# Patient Record
Sex: FEMALE | URBAN - METROPOLITAN AREA
[De-identification: names, ages, dates, MRNs, and addresses within clinical notes are randomized per-mention and may not be internally consistent; named-entity substitution may affect disease eponyms.]

---

## 2020-11-03 ENCOUNTER — APPOINTMENT (RX ONLY)
Dept: URBAN - METROPOLITAN AREA CLINIC 73 | Facility: CLINIC | Age: 75
Setting detail: DERMATOLOGY
End: 2020-11-03

## 2020-11-03 DIAGNOSIS — Z85.828 PERSONAL HISTORY OF OTHER MALIGNANT NEOPLASM OF SKIN: ICD-10-CM

## 2020-11-03 DIAGNOSIS — L21.8 OTHER SEBORRHEIC DERMATITIS: ICD-10-CM

## 2020-11-03 DIAGNOSIS — L85.3 XEROSIS CUTIS: ICD-10-CM

## 2020-11-03 DIAGNOSIS — L81.4 OTHER MELANIN HYPERPIGMENTATION: ICD-10-CM

## 2020-11-03 DIAGNOSIS — L57.0 ACTINIC KERATOSIS: ICD-10-CM

## 2020-11-03 DIAGNOSIS — D18.0 HEMANGIOMA: ICD-10-CM

## 2020-11-03 DIAGNOSIS — L82.1 OTHER SEBORRHEIC KERATOSIS: ICD-10-CM

## 2020-11-03 DIAGNOSIS — L24 IRRITANT CONTACT DERMATITIS: ICD-10-CM

## 2020-11-03 PROBLEM — L24.9 IRRITANT CONTACT DERMATITIS, UNSPECIFIED CAUSE: Status: ACTIVE | Noted: 2020-11-03

## 2020-11-03 PROBLEM — D18.01 HEMANGIOMA OF SKIN AND SUBCUTANEOUS TISSUE: Status: ACTIVE | Noted: 2020-11-03

## 2020-11-03 PROCEDURE — 17003 DESTRUCT PREMALG LES 2-14: CPT

## 2020-11-03 PROCEDURE — ? TREATMENT REGIMEN

## 2020-11-03 PROCEDURE — ? OTHER

## 2020-11-03 PROCEDURE — 17000 DESTRUCT PREMALG LESION: CPT

## 2020-11-03 PROCEDURE — ? PRESCRIPTION

## 2020-11-03 PROCEDURE — ? LIQUID NITROGEN

## 2020-11-03 PROCEDURE — ? COUNSELING

## 2020-11-03 PROCEDURE — 99203 OFFICE O/P NEW LOW 30 MIN: CPT | Mod: 25

## 2020-11-03 RX ORDER — TRIAMCINOLONE ACETONIDE 1 MG/G
CREAM TOPICAL BID
Qty: 1 | Refills: 1 | Status: ERX | COMMUNITY
Start: 2020-11-03

## 2020-11-03 RX ORDER — FLUOCINONIDE 0.5 MG/ML
SOLUTION TOPICAL
Qty: 1 | Refills: 1 | Status: ERX | COMMUNITY
Start: 2020-11-03

## 2020-11-03 RX ADMIN — FLUOCINONIDE: 0.5 SOLUTION TOPICAL at 00:00

## 2020-11-03 RX ADMIN — TRIAMCINOLONE ACETONIDE: 1 CREAM TOPICAL at 00:00

## 2020-11-03 ASSESSMENT — LOCATION SIMPLE DESCRIPTION DERM
LOCATION SIMPLE: LEFT FOREHEAD
LOCATION SIMPLE: RIGHT CHEEK
LOCATION SIMPLE: RIGHT LIP
LOCATION SIMPLE: RIGHT EAR
LOCATION SIMPLE: RIGHT UPPER ARM
LOCATION SIMPLE: LEFT PRETIBIAL REGION
LOCATION SIMPLE: RIGHT POSTERIOR THIGH
LOCATION SIMPLE: LEFT EAR
LOCATION SIMPLE: RIGHT NOSE
LOCATION SIMPLE: NOSE
LOCATION SIMPLE: POSTERIOR NECK
LOCATION SIMPLE: LEFT BUTTOCK
LOCATION SIMPLE: CHEST
LOCATION SIMPLE: RIGHT UPPER BACK
LOCATION SIMPLE: SCALP
LOCATION SIMPLE: LEFT POSTERIOR THIGH
LOCATION SIMPLE: RIGHT PRETIBIAL REGION

## 2020-11-03 ASSESSMENT — LOCATION DETAILED DESCRIPTION DERM
LOCATION DETAILED: LEFT BUTTOCK
LOCATION DETAILED: RIGHT SUPERIOR CRUS OF ANTIHELIX
LOCATION DETAILED: RIGHT MEDIAL SUPERIOR CHEST
LOCATION DETAILED: RIGHT INFERIOR VERMILION LIP
LOCATION DETAILED: RIGHT INFERIOR CENTRAL MALAR CHEEK
LOCATION DETAILED: RIGHT ANTERIOR LATERAL DISTAL UPPER ARM
LOCATION DETAILED: LEFT SUPERIOR CRUS OF ANTIHELIX
LOCATION DETAILED: RIGHT SUPERIOR VERMILION LIP
LOCATION DETAILED: LEFT INFERIOR FOREHEAD
LOCATION DETAILED: LEFT DISTAL POSTERIOR THIGH
LOCATION DETAILED: LEFT PROXIMAL PRETIBIAL REGION
LOCATION DETAILED: RIGHT SUPERIOR MEDIAL UPPER BACK
LOCATION DETAILED: RIGHT LATERAL TRAPEZIAL NECK
LOCATION DETAILED: RIGHT PROXIMAL PRETIBIAL REGION
LOCATION DETAILED: RIGHT PROXIMAL POSTERIOR THIGH
LOCATION DETAILED: NASAL ROOT
LOCATION DETAILED: RIGHT SUPERIOR PARIETAL SCALP
LOCATION DETAILED: RIGHT MID-UPPER BACK
LOCATION DETAILED: RIGHT NASAL DORSUM
LOCATION DETAILED: RIGHT NASAL SIDEWALL

## 2020-11-03 ASSESSMENT — LOCATION ZONE DERM
LOCATION ZONE: SCALP
LOCATION ZONE: ARM
LOCATION ZONE: LEG
LOCATION ZONE: LIP
LOCATION ZONE: NECK
LOCATION ZONE: FACE
LOCATION ZONE: TRUNK
LOCATION ZONE: EAR
LOCATION ZONE: NOSE

## 2020-11-03 ASSESSMENT — PAIN INTENSITY VAS: HOW INTENSE IS YOUR PAIN 0 BEING NO PAIN, 10 BEING THE MOST SEVERE PAIN POSSIBLE?: NO PAIN

## 2020-11-03 NOTE — PROCEDURE: OTHER
Note Text (......Xxx Chief Complaint.): This diagnosis correlates with the
Detail Level: Simple
Other (Free Text): Dry and sensitive skin care was discussed. Handout was given.
Other (Free Text): Disc could be the acidity or sulfites or other in the red wine; pt reports is going to avoid red wine; has some irritation on undersurface of upper lip also;\\nDisc using TAC 0.1% on lips bid x 5-7 days \\n-(Pt reports having angioedema when she drinks white wine - bradykinin release as reported by prev derm; cannot tolerate white wine)\\nDisc to use the TAC then can try red wine again.\\n-consider allergy/immunology prn\\n-d/w pt that we mainly see xerosis/erythema on lips\\n-dry/sens skin care

## 2020-11-03 NOTE — HPI: RASH
How Severe Is Your Rash?: moderate
Is This A New Presentation, Or A Follow-Up?: Rash
Additional History: Patient reports rash first presented in [place] about one year ago. Patient has been using otc sarna lotion which doesn’t help.
Additional History: Patient reports lips are swollen and thinks it is due to red wine.

## 2020-11-03 NOTE — PROCEDURE: LIQUID NITROGEN
Duration Of Freeze Thaw-Cycle (Seconds): 5
Post-Care Instructions: I reviewed with the patient in detail post-care instructions. Patient is to wear sunprotection, and avoid picking at any of the treated lesions. Pt may apply Vaseline to crusted or scabbing areas.
Consent: The patient's consent was obtained including but not limited to risks of crusting, scabbing, blistering, scarring, darker or lighter pigmentary change, recurrence, incomplete removal and infection.
Render Note In Bullet Format When Appropriate: No
Detail Level: Detailed
Render Post-Care Instructions In Note?: yes
Number Of Freeze-Thaw Cycles: 2 freeze-thaw cycles

## 2020-11-03 NOTE — HPI: EVALUATION OF SKIN LESION(S)
How Severe Are Your Spot(S)?: moderate
Hpi Title: Evaluation of Skin Lesions
Additional History: Patient is present for a full body skin exam.

## 2020-11-03 NOTE — PROCEDURE: TREATMENT REGIMEN
Initiate Treatment: TAC 0.1% apply to affected areas on body bid x 7-10 days
Detail Level: Zone
Plan: Ok to continue sarna lotion
Initiate Treatment: Fluocinonide solution apply to affected areas on scalp bid x 7-10 days
Initiate Treatment: TAC 0.1% on lips bid x 5-7 days

## 2020-12-01 ENCOUNTER — APPOINTMENT (RX ONLY)
Dept: URBAN - METROPOLITAN AREA CLINIC 73 | Facility: CLINIC | Age: 75
Setting detail: DERMATOLOGY
End: 2020-12-01

## 2020-12-01 DIAGNOSIS — L85.3 XEROSIS CUTIS: ICD-10-CM | Status: IMPROVED

## 2020-12-01 DIAGNOSIS — L21.8 OTHER SEBORRHEIC DERMATITIS: ICD-10-CM | Status: IMPROVED

## 2020-12-01 DIAGNOSIS — L24 IRRITANT CONTACT DERMATITIS: ICD-10-CM | Status: INADEQUATELY CONTROLLED

## 2020-12-01 PROBLEM — L24.9 IRRITANT CONTACT DERMATITIS, UNSPECIFIED CAUSE: Status: ACTIVE | Noted: 2020-12-01

## 2020-12-01 PROCEDURE — ? OTHER

## 2020-12-01 PROCEDURE — ? COUNSELING

## 2020-12-01 PROCEDURE — ? TREATMENT REGIMEN

## 2020-12-01 PROCEDURE — 99214 OFFICE O/P EST MOD 30 MIN: CPT

## 2020-12-01 ASSESSMENT — LOCATION ZONE DERM
LOCATION ZONE: TRUNK
LOCATION ZONE: LIP
LOCATION ZONE: EAR
LOCATION ZONE: SCALP

## 2020-12-01 ASSESSMENT — LOCATION SIMPLE DESCRIPTION DERM
LOCATION SIMPLE: RIGHT EAR
LOCATION SIMPLE: RIGHT UPPER BACK
LOCATION SIMPLE: RIGHT LIP
LOCATION SIMPLE: SCALP
LOCATION SIMPLE: LEFT EAR

## 2020-12-01 ASSESSMENT — LOCATION DETAILED DESCRIPTION DERM
LOCATION DETAILED: RIGHT SUPERIOR CRUS OF ANTIHELIX
LOCATION DETAILED: LEFT SUPERIOR CRUS OF ANTIHELIX
LOCATION DETAILED: RIGHT SUPERIOR PARIETAL SCALP
LOCATION DETAILED: RIGHT SUPERIOR VERMILION LIP
LOCATION DETAILED: RIGHT INFERIOR VERMILION LIP
LOCATION DETAILED: RIGHT SUPERIOR MEDIAL UPPER BACK

## 2020-12-01 ASSESSMENT — PAIN INTENSITY VAS: HOW INTENSE IS YOUR PAIN 0 BEING NO PAIN, 10 BEING THE MOST SEVERE PAIN POSSIBLE?: NO PAIN

## 2020-12-01 NOTE — PROCEDURE: TREATMENT REGIMEN
Detail Level: Zone
Continue Regimen: TAC 0.1% on lips bid x 7 days
Continue Regimen: TAC 0.1% apply to affected areas on body bid x 7-10 days
Continue Regimen: Fluocinonide solution apply to affected areas on scalp bid x 7-10 days
Otc Regimen: Antidandruff shampoo wash scalp and ears TIW

## 2020-12-01 NOTE — PROCEDURE: OTHER
Other (Free Text): - Pt reports she has avoided red and white wine since the last OV\\n- Pt reports she has been using hypoallergenic products.\\n- Pt uses CeraVe product on face and make up products that pt cannot remember the name\\n- Pt will continue Vaseline and hypoallergenic products \\n- Pt will call back with makeup product and product that she applies on lips\\n- disc recommend patch testing NA 80 in the future with Dr. Lino or Dr. Ben Prabhakar\\n- Pt reports eating spicy chips - will try to see if makes lips worse\\n- disc common allergens: fragrances, nickel, preservatives etc and to avoid, dejon propylene glycol for now\\n- disc will restart TAC 0.1% bid x 1 week. Pt will take a photo of lips daily
Detail Level: Simple
Note Text (......Xxx Chief Complaint.): This diagnosis correlates with the
Other (Free Text): Dry and sensitive skin care was discussed.\\nDisc rash is still active in areas that are hard to reach for the patient. Will use TAC 0.1% bid x 7-10 days
Other (Free Text): Disc using Fluocinonide solution bid x 7-10 days for flares\\nRecommended using antidandruff shampoo for maintenance. Pt reports using tea tree oil shampoo\\nPt will test spot tea tree oil shampoo on inner fold of arm for possible ICD rash.

## 2021-09-07 ENCOUNTER — APPOINTMENT (RX ONLY)
Dept: URBAN - METROPOLITAN AREA CLINIC 73 | Facility: CLINIC | Age: 76
Setting detail: DERMATOLOGY
End: 2021-09-07

## 2021-09-07 DIAGNOSIS — T07XXXA INSECT BITE, NONVENOMOUS, OF OTHER, MULTIPLE, AND UNSPECIFIED SITES, WITHOUT MENTION OF INFECTION: ICD-10-CM

## 2021-09-07 DIAGNOSIS — L85.3 XEROSIS CUTIS: ICD-10-CM | Status: INADEQUATELY CONTROLLED

## 2021-09-07 DIAGNOSIS — D22 MELANOCYTIC NEVI: ICD-10-CM

## 2021-09-07 DIAGNOSIS — D18.0 HEMANGIOMA: ICD-10-CM

## 2021-09-07 DIAGNOSIS — L82.1 OTHER SEBORRHEIC KERATOSIS: ICD-10-CM

## 2021-09-07 DIAGNOSIS — L82.0 INFLAMED SEBORRHEIC KERATOSIS: ICD-10-CM

## 2021-09-07 DIAGNOSIS — Z85.828 PERSONAL HISTORY OF OTHER MALIGNANT NEOPLASM OF SKIN: ICD-10-CM

## 2021-09-07 DIAGNOSIS — L81.4 OTHER MELANIN HYPERPIGMENTATION: ICD-10-CM

## 2021-09-07 DIAGNOSIS — L57.0 ACTINIC KERATOSIS: ICD-10-CM

## 2021-09-07 DIAGNOSIS — L72.0 EPIDERMAL CYST: ICD-10-CM

## 2021-09-07 PROBLEM — D22.5 MELANOCYTIC NEVI OF TRUNK: Status: ACTIVE | Noted: 2021-09-07

## 2021-09-07 PROBLEM — D48.5 NEOPLASM OF UNCERTAIN BEHAVIOR OF SKIN: Status: ACTIVE | Noted: 2021-09-07

## 2021-09-07 PROBLEM — D22.72 MELANOCYTIC NEVI OF LEFT LOWER LIMB, INCLUDING HIP: Status: ACTIVE | Noted: 2021-09-07

## 2021-09-07 PROBLEM — S40.862A INSECT BITE (NONVENOMOUS) OF LEFT UPPER ARM, INITIAL ENCOUNTER: Status: ACTIVE | Noted: 2021-09-07

## 2021-09-07 PROBLEM — D18.01 HEMANGIOMA OF SKIN AND SUBCUTANEOUS TISSUE: Status: ACTIVE | Noted: 2021-09-07

## 2021-09-07 PROCEDURE — 99213 OFFICE O/P EST LOW 20 MIN: CPT | Mod: 25

## 2021-09-07 PROCEDURE — ? ACNE SURGERY

## 2021-09-07 PROCEDURE — 10040 EXTRACTION: CPT

## 2021-09-07 PROCEDURE — 17110 DESTRUCTION B9 LES UP TO 14: CPT

## 2021-09-07 PROCEDURE — ? TREATMENT REGIMEN

## 2021-09-07 PROCEDURE — 17000 DESTRUCT PREMALG LESION: CPT | Mod: 59

## 2021-09-07 PROCEDURE — ? LIQUID NITROGEN

## 2021-09-07 PROCEDURE — ? COUNSELING

## 2021-09-07 PROCEDURE — 17003 DESTRUCT PREMALG LES 2-14: CPT | Mod: 59

## 2021-09-07 PROCEDURE — ? OBSERVATION AND MEASURE

## 2021-09-07 ASSESSMENT — LOCATION SIMPLE DESCRIPTION DERM
LOCATION SIMPLE: LEFT POSTERIOR UPPER ARM
LOCATION SIMPLE: LEFT CALF
LOCATION SIMPLE: RIGHT CHEEK
LOCATION SIMPLE: LEFT GREAT TOE
LOCATION SIMPLE: RIGHT SCALP
LOCATION SIMPLE: RIGHT EAR
LOCATION SIMPLE: RIGHT POPLITEAL SKIN
LOCATION SIMPLE: RIGHT NOSE
LOCATION SIMPLE: CHEST
LOCATION SIMPLE: RIGHT UPPER BACK
LOCATION SIMPLE: LEFT UPPER BACK
LOCATION SIMPLE: LEFT CHEEK

## 2021-09-07 ASSESSMENT — LOCATION DETAILED DESCRIPTION DERM
LOCATION DETAILED: RIGHT MID-UPPER BACK
LOCATION DETAILED: LEFT SUPERIOR UPPER BACK
LOCATION DETAILED: RIGHT MEDIAL SUPERIOR CHEST
LOCATION DETAILED: LEFT PROXIMAL POSTERIOR UPPER ARM
LOCATION DETAILED: RIGHT LATERAL MALAR CHEEK
LOCATION DETAILED: RIGHT SUPERIOR MEDIAL UPPER BACK
LOCATION DETAILED: LEFT INFERIOR CENTRAL MALAR CHEEK
LOCATION DETAILED: LEFT PROXIMAL CALF
LOCATION DETAILED: RIGHT SUPERIOR HELIX
LOCATION DETAILED: RIGHT NARIS
LOCATION DETAILED: LEFT DORSAL GREAT TOE
LOCATION DETAILED: RIGHT CENTRAL FRONTAL SCALP
LOCATION DETAILED: RIGHT INFERIOR CENTRAL MALAR CHEEK
LOCATION DETAILED: RIGHT MEDIAL MALAR CHEEK
LOCATION DETAILED: RIGHT POPLITEAL SKIN

## 2021-09-07 ASSESSMENT — LOCATION ZONE DERM
LOCATION ZONE: FACE
LOCATION ZONE: EAR
LOCATION ZONE: ARM
LOCATION ZONE: TOE
LOCATION ZONE: SCALP
LOCATION ZONE: NOSE
LOCATION ZONE: LEG
LOCATION ZONE: TRUNK

## 2021-09-07 NOTE — PROCEDURE: COUNSELING
Detail Level: Zone
Detail Level: Detailed
Patient Specific Counseling (Will Not Stick From Patient To Patient): -disc r/b/sed retin-A vs acne surgery

## 2021-09-07 NOTE — PROCEDURE: LIQUID NITROGEN
Duration Of Freeze Thaw-Cycle (Seconds): 5
Show Applicator Variable?: Yes
Detail Level: Detailed
Consent: The patient's consent was obtained including but not limited to risks of crusting, scabbing, blistering, scarring, darker or lighter pigmentary change, recurrence, incomplete removal and infection.
Post-Care Instructions: I reviewed with the patient in detail post-care instructions. Patient is to wear sunprotection, and avoid picking at any of the treated lesions. Pt may apply Vaseline to crusted or scabbing areas.
Number Of Freeze-Thaw Cycles: 2 freeze-thaw cycles
Render Note In Bullet Format When Appropriate: No
Medical Necessity Information: It is in your best interest to select a reason for this procedure from the list below. All of these items fulfill various CMS LCD requirements except the new and changing color options.
Medical Necessity Clause: This procedure was medically necessary because the lesions that were treated were:  inflamed, irritated

## 2021-09-07 NOTE — HPI: SKIN LESION
Is This A New Presentation, Or A Follow-Up?: Skin Lesions
How Severe Is Your Skin Lesion?: moderate
Has Your Skin Lesion Been Treated?: not been treated
Additional History: Patient presents with lesions on face reported as white, hard, raised bumps with no sensation and no previous treatment.

## 2021-09-07 NOTE — PROCEDURE: ACNE SURGERY
Post-Care Instructions: I reviewed with the patient in detail post-care instructions. Patient is to keep the treatment areaas dry overnight, and then apply bacitracin twice daily until healed. Patient may apply hydrogen peroxide soaks to remove any crusting.
Extraction Method: 30 gauge needle and comedo extractor
Acne Type: Comedonal Lesions
Consent was obtained and risks were reviewed including but not limited to scarring, infection, bleeding, scabbing, incomplete removal, and allergy to anesthesia.
Prep Text (Optional): Prior to removal the treatment areas were prepped in the usual fashion.
Hemostasis: Electrocautery
Render Number Of Lesions Treated: yes
Detail Level: Detailed

## 2022-09-13 ENCOUNTER — APPOINTMENT (RX ONLY)
Dept: URBAN - METROPOLITAN AREA CLINIC 73 | Facility: CLINIC | Age: 77
Setting detail: DERMATOLOGY
End: 2022-09-13

## 2022-09-13 DIAGNOSIS — L57.0 ACTINIC KERATOSIS: ICD-10-CM

## 2022-09-13 DIAGNOSIS — L85.3 XEROSIS CUTIS: ICD-10-CM | Status: IMPROVED

## 2022-09-13 DIAGNOSIS — D22 MELANOCYTIC NEVI: ICD-10-CM

## 2022-09-13 DIAGNOSIS — Z85.828 PERSONAL HISTORY OF OTHER MALIGNANT NEOPLASM OF SKIN: ICD-10-CM

## 2022-09-13 DIAGNOSIS — L82.0 INFLAMED SEBORRHEIC KERATOSIS: ICD-10-CM

## 2022-09-13 DIAGNOSIS — D18.0 HEMANGIOMA: ICD-10-CM

## 2022-09-13 DIAGNOSIS — L82.1 OTHER SEBORRHEIC KERATOSIS: ICD-10-CM

## 2022-09-13 DIAGNOSIS — L21.8 OTHER SEBORRHEIC DERMATITIS: ICD-10-CM

## 2022-09-13 DIAGNOSIS — L81.4 OTHER MELANIN HYPERPIGMENTATION: ICD-10-CM

## 2022-09-13 PROBLEM — D22.72 MELANOCYTIC NEVI OF LEFT LOWER LIMB, INCLUDING HIP: Status: ACTIVE | Noted: 2022-09-13

## 2022-09-13 PROBLEM — D18.01 HEMANGIOMA OF SKIN AND SUBCUTANEOUS TISSUE: Status: ACTIVE | Noted: 2022-09-13

## 2022-09-13 PROCEDURE — ? PATIENT SPECIFIC COUNSELING

## 2022-09-13 PROCEDURE — ? TREATMENT REGIMEN

## 2022-09-13 PROCEDURE — ? OBSERVATION AND MEASURE

## 2022-09-13 PROCEDURE — ? PRESCRIPTION

## 2022-09-13 PROCEDURE — 17110 DESTRUCTION B9 LES UP TO 14: CPT

## 2022-09-13 PROCEDURE — 99214 OFFICE O/P EST MOD 30 MIN: CPT | Mod: 25

## 2022-09-13 PROCEDURE — 17000 DESTRUCT PREMALG LESION: CPT | Mod: 59

## 2022-09-13 PROCEDURE — ? COUNSELING

## 2022-09-13 PROCEDURE — 17003 DESTRUCT PREMALG LES 2-14: CPT | Mod: 59

## 2022-09-13 PROCEDURE — ? LIQUID NITROGEN

## 2022-09-13 RX ORDER — FLUOCINONIDE 0.5 MG/ML
SOLUTION TOPICAL
Qty: 60 | Refills: 2 | Status: ERX | COMMUNITY
Start: 2022-09-13

## 2022-09-13 RX ORDER — TRIAMCINOLONE ACETONIDE 1 MG/G
CREAM TOPICAL
Qty: 80 | Refills: 1 | Status: ERX

## 2022-09-13 RX ADMIN — FLUOCINONIDE: 0.5 SOLUTION TOPICAL at 00:00

## 2022-09-13 ASSESSMENT — LOCATION SIMPLE DESCRIPTION DERM
LOCATION SIMPLE: LEFT FOOT
LOCATION SIMPLE: RIGHT EAR
LOCATION SIMPLE: RIGHT POPLITEAL SKIN
LOCATION SIMPLE: RIGHT UPPER BACK
LOCATION SIMPLE: LEFT FOREHEAD
LOCATION SIMPLE: LEFT EAR
LOCATION SIMPLE: LEFT CHEEK
LOCATION SIMPLE: CHEST
LOCATION SIMPLE: RIGHT CHEEK
LOCATION SIMPLE: POSTERIOR NECK
LOCATION SIMPLE: SCALP
LOCATION SIMPLE: LEFT GREAT TOE

## 2022-09-13 ASSESSMENT — LOCATION ZONE DERM
LOCATION ZONE: TOE
LOCATION ZONE: FEET
LOCATION ZONE: LEG
LOCATION ZONE: FACE
LOCATION ZONE: EAR
LOCATION ZONE: TRUNK
LOCATION ZONE: SCALP
LOCATION ZONE: NECK

## 2022-09-13 ASSESSMENT — LOCATION DETAILED DESCRIPTION DERM
LOCATION DETAILED: RIGHT INFERIOR MEDIAL MALAR CHEEK
LOCATION DETAILED: RIGHT MID-UPPER BACK
LOCATION DETAILED: RIGHT MEDIAL SUPERIOR CHEST
LOCATION DETAILED: LEFT SUPERIOR CRUS OF ANTIHELIX
LOCATION DETAILED: RIGHT POPLITEAL SKIN
LOCATION DETAILED: RIGHT INFERIOR CENTRAL MALAR CHEEK
LOCATION DETAILED: UPPER STERNUM
LOCATION DETAILED: RIGHT SUPERIOR MEDIAL UPPER BACK
LOCATION DETAILED: LEFT LATERAL DORSAL FOOT
LOCATION DETAILED: LEFT INFERIOR CENTRAL MALAR CHEEK
LOCATION DETAILED: LEFT CAVUM CONCHA
LOCATION DETAILED: LEFT FOREHEAD
LOCATION DETAILED: LEFT INFERIOR MEDIAL MALAR CHEEK
LOCATION DETAILED: RIGHT SUPERIOR PARIETAL SCALP
LOCATION DETAILED: RIGHT SUPERIOR CRUS OF ANTIHELIX
LOCATION DETAILED: LEFT DORSAL FOOT
LOCATION DETAILED: RIGHT LATERAL TRAPEZIAL NECK
LOCATION DETAILED: LEFT LATERAL GREAT TOE

## 2022-09-13 NOTE — PROCEDURE: LIQUID NITROGEN
Render Note In Bullet Format When Appropriate: No
Spray Paint Text: The liquid nitrogen was applied to the skin utilizing a spray paint frosting technique.
Medical Necessity Information: It is in your best interest to select a reason for this procedure from the list below. All of these items fulfill various CMS LCD requirements except the new and changing color options.
Show Spray Paint Technique Variable?: Yes
Medical Necessity Clause: This procedure was medically necessary because the lesions that were treated were:  inflamed, irritated
Consent: The patient's consent was obtained including but not limited to risks of crusting, scabbing, blistering, scarring, darker or lighter pigmentary change, recurrence, incomplete removal and infection.
Detail Level: Detailed
Application Tool (Optional): Cotton Tipped Applicator
Post-Care Instructions: I reviewed with the patient in detail post-care instructions. Patient is to wear sunprotection, and avoid picking at any of the treated lesions. Pt may apply Vaseline to crusted or scabbing areas.
Number Of Freeze-Thaw Cycles: 2 freeze-thaw cycles
Application Tool (Optional): Cry-AC
Duration Of Freeze Thaw-Cycle (Seconds): 5
Aperture Size (Optional): C

## 2022-09-13 NOTE — PROCEDURE: TREATMENT REGIMEN
Detail Level: Zone
Continue Regimen: TAC 0.1% apply to affected areas on body bid x 7-10 days
Continue Regimen: Fluocinonide solution apply to affected areas on scalp qhs x 7-10 days
Plan: Pt requested refills

## 2022-09-13 NOTE — PROCEDURE: MIPS QUALITY
Quality 111:Pneumonia Vaccination Status For Older Adults: Pneumococcal vaccine (PPSV23) administered on or after patient’s 60th birthday and before the end of the measurement period
Name And Contact Information For Health Care Proxy: Unknown
Detail Level: Detailed
Quality 130: Documentation Of Current Medications In The Medical Record: Current Medications Documented
Quality 47: Advance Care Plan: Advance Care Planning discussed and documented; advance care plan or surrogate decision maker documented in the medical record.
Quality 110: Preventive Care And Screening: Influenza Immunization: Influenza Immunization not Administered for Documented Reasons.
Additional Notes: Pt declined flu shot

## 2022-09-13 NOTE — PROCEDURE: OBSERVATION
Detail Level: Detailed
Size Of Lesion: 2cm
Size Of Lesion: 3mm
Body Location Override (Optional - Billing Will Still Be Based On Selected Body Map Location If Applicable): across L dorsal foot
Size Of Lesion: 1-2mm

## 2023-09-12 ENCOUNTER — APPOINTMENT (RX ONLY)
Dept: URBAN - METROPOLITAN AREA CLINIC 73 | Facility: CLINIC | Age: 78
Setting detail: DERMATOLOGY
End: 2023-09-12

## 2023-09-12 DIAGNOSIS — F42.4 EXCORIATION (SKIN-PICKING) DISORDER: ICD-10-CM | Status: INADEQUATELY CONTROLLED

## 2023-09-12 DIAGNOSIS — D36.1 BENIGN NEOPLASM OF PERIPHERAL NERVES AND AUTONOMIC NERVOUS SYSTEM: ICD-10-CM

## 2023-09-12 DIAGNOSIS — D18.0 HEMANGIOMA: ICD-10-CM

## 2023-09-12 DIAGNOSIS — L85.3 XEROSIS CUTIS: ICD-10-CM

## 2023-09-12 DIAGNOSIS — D22 MELANOCYTIC NEVI: ICD-10-CM

## 2023-09-12 DIAGNOSIS — L81.4 OTHER MELANIN HYPERPIGMENTATION: ICD-10-CM

## 2023-09-12 DIAGNOSIS — L57.0 ACTINIC KERATOSIS: ICD-10-CM

## 2023-09-12 DIAGNOSIS — B35.1 TINEA UNGUIUM: ICD-10-CM

## 2023-09-12 DIAGNOSIS — Z85.828 PERSONAL HISTORY OF OTHER MALIGNANT NEOPLASM OF SKIN: ICD-10-CM

## 2023-09-12 DIAGNOSIS — L72.0 EPIDERMAL CYST: ICD-10-CM

## 2023-09-12 DIAGNOSIS — L82.1 OTHER SEBORRHEIC KERATOSIS: ICD-10-CM

## 2023-09-12 DIAGNOSIS — L72.8 OTHER FOLLICULAR CYSTS OF THE SKIN AND SUBCUTANEOUS TISSUE: ICD-10-CM

## 2023-09-12 PROBLEM — D36.13 BENIGN NEOPLASM OF PERIPHERAL NERVES AND AUTONOMIC NERVOUS SYSTEM OF LOWER LIMB, INCLUDING HIP: Status: ACTIVE | Noted: 2023-09-12

## 2023-09-12 PROBLEM — D22.72 MELANOCYTIC NEVI OF LEFT LOWER LIMB, INCLUDING HIP: Status: ACTIVE | Noted: 2023-09-12

## 2023-09-12 PROBLEM — D18.01 HEMANGIOMA OF SKIN AND SUBCUTANEOUS TISSUE: Status: ACTIVE | Noted: 2023-09-12

## 2023-09-12 PROCEDURE — 10040 EXTRACTION: CPT

## 2023-09-12 PROCEDURE — 17003 DESTRUCT PREMALG LES 2-14: CPT

## 2023-09-12 PROCEDURE — ? OBSERVATION AND MEASURE

## 2023-09-12 PROCEDURE — ? TREATMENT REGIMEN

## 2023-09-12 PROCEDURE — ? LIQUID NITROGEN

## 2023-09-12 PROCEDURE — 17000 DESTRUCT PREMALG LESION: CPT

## 2023-09-12 PROCEDURE — ? ACNE SURGERY

## 2023-09-12 PROCEDURE — ? PRESCRIPTION

## 2023-09-12 PROCEDURE — ? PATIENT SPECIFIC COUNSELING

## 2023-09-12 PROCEDURE — ? COUNSELING

## 2023-09-12 PROCEDURE — ? INTRALESIONAL KENALOG

## 2023-09-12 PROCEDURE — 11900 INJECT SKIN LESIONS </W 7: CPT | Mod: 59

## 2023-09-12 PROCEDURE — 99214 OFFICE O/P EST MOD 30 MIN: CPT | Mod: 25

## 2023-09-12 RX ORDER — TRIAMCINOLONE ACETONIDE 1 MG/G
CREAM TOPICAL BID
Qty: 45 | Refills: 2 | Status: ERX

## 2023-09-12 ASSESSMENT — LOCATION SIMPLE DESCRIPTION DERM
LOCATION SIMPLE: RIGHT CHEEK
LOCATION SIMPLE: POSTERIOR NECK
LOCATION SIMPLE: LEFT GREAT TOE
LOCATION SIMPLE: RIGHT THIGH
LOCATION SIMPLE: RIGHT GREAT TOE
LOCATION SIMPLE: LEFT HAND
LOCATION SIMPLE: LEFT WRIST
LOCATION SIMPLE: CHEST
LOCATION SIMPLE: LEFT LIP
LOCATION SIMPLE: LEFT UPPER BACK
LOCATION SIMPLE: RIGHT UPPER BACK
LOCATION SIMPLE: LEFT CHEEK
LOCATION SIMPLE: RIGHT POPLITEAL SKIN
LOCATION SIMPLE: NOSE
LOCATION SIMPLE: LEFT FOOT
LOCATION SIMPLE: POSTERIOR SCALP
LOCATION SIMPLE: RIGHT HAND

## 2023-09-12 ASSESSMENT — LOCATION DETAILED DESCRIPTION DERM
LOCATION DETAILED: LEFT INFERIOR CENTRAL MALAR CHEEK
LOCATION DETAILED: LEFT LATERAL GREAT TOE
LOCATION DETAILED: RIGHT ULNAR PALM
LOCATION DETAILED: RIGHT ANTERIOR DISTAL THIGH
LOCATION DETAILED: RIGHT RADIAL DORSAL HAND
LOCATION DETAILED: LEFT SUPERIOR MEDIAL UPPER BACK
LOCATION DETAILED: LOWER STERNUM
LOCATION DETAILED: LEFT LATERAL DORSAL FOOT
LOCATION DETAILED: LEFT THENAR EMINENCE
LOCATION DETAILED: MID-OCCIPITAL SCALP
LOCATION DETAILED: NASAL DORSUM
LOCATION DETAILED: LEFT MEDIAL DORSAL WRIST
LOCATION DETAILED: RIGHT LATERAL TRAPEZIAL NECK
LOCATION DETAILED: LEFT DORSAL FOOT
LOCATION DETAILED: LEFT UPPER CUTANEOUS LIP
LOCATION DETAILED: RIGHT MEDIAL SUPERIOR CHEST
LOCATION DETAILED: RIGHT POPLITEAL SKIN
LOCATION DETAILED: RIGHT GREAT TOENAIL
LOCATION DETAILED: RIGHT MID-UPPER BACK
LOCATION DETAILED: RIGHT INFERIOR CENTRAL MALAR CHEEK
LOCATION DETAILED: LEFT GREAT TOENAIL

## 2023-09-12 ASSESSMENT — LOCATION ZONE DERM
LOCATION ZONE: TOENAIL
LOCATION ZONE: LEG
LOCATION ZONE: FACE
LOCATION ZONE: LIP
LOCATION ZONE: SCALP
LOCATION ZONE: ARM
LOCATION ZONE: TRUNK
LOCATION ZONE: NECK
LOCATION ZONE: TOE
LOCATION ZONE: NOSE
LOCATION ZONE: HAND
LOCATION ZONE: FEET

## 2023-09-12 NOTE — PROCEDURE: INTRALESIONAL KENALOG
Bill For Wasted Drug?: no
Total Volume Injected (Ccs- Only Use Numbers And Decimals): 0.15
Kenalog Preparation: Kenalog
Concentration Of Solution Injected (Mg/Ml): 10.0
Validate Note Data When Using Inventory: Yes
How Many Mls Were Removed From The 40 Mg/Ml (5ml) Vial When Preparing The Injectable Solution?: 0
Medical Necessity Clause: This procedure was medically necessary because the lesions that were treated were:\\na/s did not help/not successful
Administered By (Optional): jerrell rod
Kenalog Type Of Vial: Multiple Dose
Detail Level: Detailed
Consent: The risks of atrophy were reviewed with the patient.

## 2023-09-12 NOTE — PROCEDURE: PATIENT SPECIFIC COUNSELING
-pt reports her toenails have always been this way - has polish on top\\n-disc r/b/sed topicals vs orals, advised nail clipping should be done prior to confirm OM for orals\\n-pt to sched future visit when has polish off and will consider in meantime.
Detail Level: Detailed
-pt back and shoulders flaring, pt has been scratching area a lot\\n-pt to continue fluocinonide and TAC, sending RF of TAC today\\n-pt needs back applicator, none available to give at this visit

## 2023-09-12 NOTE — PROCEDURE: LIQUID NITROGEN
Post-Care Instructions: I reviewed with the patient in detail post-care instructions. Patient is to wear sunprotection, and avoid picking at any of the treated lesions. Pt may apply Vaseline to crusted or scabbing areas.
Detail Level: Detailed
Render Post-Care Instructions In Note?: no
Number Of Freeze-Thaw Cycles: 2 freeze-thaw cycles
Consent: The patient's consent was obtained including but not limited to risks of crusting, scabbing, blistering, scarring, darker or lighter pigmentary change, recurrence, incomplete removal and infection.
Show Aperture Variable?: Yes
Aperture Size (Optional): C
Application Tool (Optional): Cry-AC
Duration Of Freeze Thaw-Cycle (Seconds): 4

## 2023-09-12 NOTE — PROCEDURE: OBSERVATION
Detail Level: Detailed
Size Of Lesion: 3mm
Body Location Override (Optional - Billing Will Still Be Based On Selected Body Map Location If Applicable): across L dorsal foot
Size Of Lesion: 1-2mm
Size Of Lesion: 2?cm

## 2023-09-12 NOTE — PROCEDURE: TREATMENT REGIMEN
Initiate Treatment: triamcinolone acetonide 0.1 % topical cream BID\\nSig: Apply to affected areas on back bid x 7-10 days. PRN flares
Detail Level: Zone

## 2023-09-12 NOTE — PROCEDURE: ACNE SURGERY
Prep Text (Optional): Prior to removal the treatment areas were prepped in the usual fashion.
Render Post-Care Instructions In Note?: no
Acne Type: Milial cysts
Consent was obtained and risks were reviewed including but not limited to scarring, infection, bleeding, scabbing, incomplete removal, and allergy to anesthesia.
Detail Level: Detailed
Render Number Of Lesions Treated: yes
Extraction Method: electrocautery
Post-Care Instructions: I reviewed with the patient in detail post-care instructions. Patient is to keep the treatment areaas dry overnight, and then apply bacitracin twice daily until healed. Patient may apply hydrogen peroxide soaks to remove any crusting.

## 2023-09-12 NOTE — PROCEDURE: MIPS QUALITY
Quality 110: Preventive Care And Screening: Influenza Immunization: Influenza Immunization Administered during Influenza season
Quality 111:Pneumonia Vaccination Status For Older Adults: Patient received any pneumococcal conjugate or polysaccharide vaccine on or after their 60th birthday and before the end of the measurement period
Quality 226: Preventive Care And Screening: Tobacco Use: Screening And Cessation Intervention: Patient screened for tobacco use and is an ex/non-smoker
Quality 130: Documentation Of Current Medications In The Medical Record: Current Medications Documented
Detail Level: Detailed

## 2023-11-14 ENCOUNTER — APPOINTMENT (RX ONLY)
Dept: URBAN - METROPOLITAN AREA CLINIC 73 | Facility: CLINIC | Age: 78
Setting detail: DERMATOLOGY
End: 2023-11-14

## 2023-11-14 DIAGNOSIS — L29.8 OTHER PRURITUS: ICD-10-CM | Status: INADEQUATELY CONTROLLED

## 2023-11-14 DIAGNOSIS — L29.89 OTHER PRURITUS: ICD-10-CM | Status: INADEQUATELY CONTROLLED

## 2023-11-14 PROCEDURE — 99214 OFFICE O/P EST MOD 30 MIN: CPT

## 2023-11-14 PROCEDURE — ? TREATMENT REGIMEN

## 2023-11-14 PROCEDURE — ? PRESCRIPTION

## 2023-11-14 PROCEDURE — ? PATIENT SPECIFIC COUNSELING

## 2023-11-14 PROCEDURE — ? COUNSELING

## 2023-11-14 RX ORDER — CLOBETASOL PROPIONATE 0.5 MG/G
CREAM TOPICAL
Qty: 60 | Refills: 1 | Status: ERX | COMMUNITY
Start: 2023-11-14

## 2023-11-14 RX ADMIN — CLOBETASOL PROPIONATE: 0.5 CREAM TOPICAL at 00:00

## 2023-11-14 ASSESSMENT — LOCATION SIMPLE DESCRIPTION DERM: LOCATION SIMPLE: ABDOMEN

## 2023-11-14 ASSESSMENT — LOCATION DETAILED DESCRIPTION DERM: LOCATION DETAILED: PERIUMBILICAL SKIN

## 2023-11-14 ASSESSMENT — LOCATION ZONE DERM: LOCATION ZONE: TRUNK

## 2023-11-14 NOTE — PROCEDURE: PATIENT SPECIFIC COUNSELING
-pt reports itching better in the summer\\n-pt reports TAC 0.1% qhs not improving condition; no new meds, skin care products, etc\\n-pt reports she is already using proper dry/sensitive skin care, given handout today \\n-pt signed release of records to get labs from PCP, if anything is missing our office will let pt know\\n-sending to be used clobetasol BID X 10 days (disc r/b/sed)\\n-pt given CeraVe anti itch sample today
Detail Level: Detailed

## 2023-11-14 NOTE — PROCEDURE: TREATMENT REGIMEN
Samples Given: CeraVe Anti Itch
Initiate Treatment: clobetasol 0.05 % topical cream \\nSig: Apply to affected areas on trunk BID x 10 days
Detail Level: Zone

## 2023-11-14 NOTE — PROCEDURE: MIPS QUALITY
Additional Notes: Pt declined influenza vaccine at this time
Quality 130: Documentation Of Current Medications In The Medical Record: Current Medications Documented
Quality 111:Pneumonia Vaccination Status For Older Adults: Patient received any pneumococcal conjugate or polysaccharide vaccine on or after their 60th birthday and before the end of the measurement period
Quality 226: Preventive Care And Screening: Tobacco Use: Screening And Cessation Intervention: Patient screened for tobacco use and is an ex/non-smoker
Quality 110: Preventive Care And Screening: Influenza Immunization: Influenza Immunization not Administered for Documented Reasons.
Detail Level: Detailed

## 2023-11-27 ENCOUNTER — APPOINTMENT (RX ONLY)
Dept: URBAN - METROPOLITAN AREA CLINIC 73 | Facility: CLINIC | Age: 78
Setting detail: DERMATOLOGY
End: 2023-11-27

## 2023-11-27 DIAGNOSIS — L29.89 OTHER PRURITUS: ICD-10-CM

## 2023-11-27 DIAGNOSIS — L29.8 OTHER PRURITUS: ICD-10-CM

## 2023-11-27 PROCEDURE — ? ORDER TESTS

## 2023-11-27 NOTE — PROCEDURE: ORDER TESTS
Billing Type: Third-Party Bill
Performing Laboratory: 0
Expected Date Of Service: 11/27/2023
Bill For Surgical Tray: no

## 2023-12-06 ENCOUNTER — APPOINTMENT (RX ONLY)
Dept: URBAN - METROPOLITAN AREA CLINIC 73 | Facility: CLINIC | Age: 78
Setting detail: DERMATOLOGY
End: 2023-12-06

## 2023-12-06 DIAGNOSIS — L28.1 PRURIGO NODULARIS: ICD-10-CM

## 2023-12-06 PROBLEM — L30.9 DERMATITIS, UNSPECIFIED: Status: ACTIVE | Noted: 2023-12-06

## 2023-12-06 PROCEDURE — ? COUNSELING

## 2023-12-06 PROCEDURE — ? TREATMENT REGIMEN

## 2023-12-06 PROCEDURE — ? SEPARATE AND IDENTIFIABLE DOCUMENTATION

## 2023-12-06 PROCEDURE — ? BIOPSY BY PUNCH METHOD

## 2023-12-06 PROCEDURE — 11104 PUNCH BX SKIN SINGLE LESION: CPT

## 2023-12-06 PROCEDURE — ? PRESCRIPTION

## 2023-12-06 PROCEDURE — 99213 OFFICE O/P EST LOW 20 MIN: CPT | Mod: 25

## 2023-12-06 RX ORDER — PHARMACY COMPOUNDING ACCESSORY
EACH MISCELLANEOUS
Qty: 30 | Refills: 2 | Status: ERX

## 2023-12-06 RX ORDER — PREDNISONE 10 MG/1
TABLET ORAL
Qty: 20 | Refills: 0 | Status: ERX

## 2023-12-06 ASSESSMENT — LOCATION SIMPLE DESCRIPTION DERM
LOCATION SIMPLE: UPPER BACK
LOCATION SIMPLE: LEFT UPPER BACK

## 2023-12-06 ASSESSMENT — LOCATION ZONE DERM: LOCATION ZONE: TRUNK

## 2023-12-06 ASSESSMENT — LOCATION DETAILED DESCRIPTION DERM
LOCATION DETAILED: SUPERIOR THORACIC SPINE
LOCATION DETAILED: LEFT SUPERIOR UPPER BACK

## 2023-12-06 NOTE — PROCEDURE: MIPS QUALITY
Quality 110: Preventive Care And Screening: Influenza Immunization: Influenza Immunization not Administered for Documented Reasons.
Detail Level: Detailed
Quality 226: Preventive Care And Screening: Tobacco Use: Screening And Cessation Intervention: Patient screened for tobacco use and is an ex/non-smoker
Quality 111:Pneumonia Vaccination Status For Older Adults: Patient received any pneumococcal conjugate or polysaccharide vaccine on or after their 60th birthday and before the end of the measurement period
Quality 130: Documentation Of Current Medications In The Medical Record: Current Medications Documented
Quality 47: Advance Care Plan: Advance Care Planning discussed and documented; advance care plan or surrogate decision maker documented in the medical record.

## 2023-12-06 NOTE — PROCEDURE: BIOPSY BY PUNCH METHOD
Detail Level: Detailed
Was A Bandage Applied: Yes
Punch Size In Mm: 4
Size Of Lesion In Cm (Optional): 0
Depth Of Punch Biopsy: dermis
Biopsy Type: H and E
Anesthesia Type: 1% lidocaine without epinephrine
Anesthesia Volume In Cc (Will Not Render If 0): 0.5
Hemostasis: Pressure
Epidermal Sutures: 4-0 Monosof
Number Of Epidermal Sutures (Optional): 1
Wound Care: Vaseline
Dressing: pressure dressing
Suture Removal: 14 days
Patient Will Remove Sutures At Home?: No
Lab: [id number]
Consent: Written consent was obtained and risks were reviewed including but not limited to scarring, infection, bleeding, scabbing, incomplete removal, nerve damage and allergy to anesthesia.
Post-Care Instructions: I reviewed with the patient in detail post-care instructions. Patient is to keep the biopsy site dry overnight, and then apply bacitracin twice daily until healed. Patient may apply hydrogen peroxide soaks to remove any crusting.
Home Suture Removal Text: Patient was provided a home suture removal kit and will remove their sutures at home.  If they have any questions or difficulties they will call the office.
Notification Instructions: Patient will be notified of biopsy results. However, patient instructed to call the office if not contacted within 2 weeks.
Billing Type: Third-Party Bill
Information: Selecting Yes will display possible errors in your note based on the variables you have selected. This validation is only offered as a suggestion for you. PLEASE NOTE THAT THE VALIDATION TEXT WILL BE REMOVED WHEN YOU FINALIZE YOUR NOTE. IF YOU WANT TO FAX A PRELIMINARY NOTE YOU WILL NEED TO TOGGLE THIS TO 'NO' IF YOU DO NOT WANT IT IN YOUR FAXED NOTE.

## 2023-12-06 NOTE — PROCEDURE: TREATMENT REGIMEN
Initiate Treatment: Prednisone 10mg Take 4 tabs po qam x 2 days, 3 tabs po qam x 2 days, 2 tabs po qam x 2 days, 1 tab po qam x 2 days\\nLidocaine 2%/amitriptylline 2%/ketamine 1% apply to affected areas on body bid
Detail Level: Zone

## 2023-12-20 ENCOUNTER — APPOINTMENT (RX ONLY)
Dept: URBAN - METROPOLITAN AREA CLINIC 73 | Facility: CLINIC | Age: 78
Setting detail: DERMATOLOGY
End: 2023-12-20

## 2023-12-20 DIAGNOSIS — Z48.02 ENCOUNTER FOR REMOVAL OF SUTURES: ICD-10-CM

## 2023-12-20 PROCEDURE — ? SUTURE REMOVAL (GLOBAL PERIOD)

## 2023-12-20 ASSESSMENT — LOCATION ZONE DERM: LOCATION ZONE: TRUNK

## 2023-12-20 ASSESSMENT — LOCATION SIMPLE DESCRIPTION DERM: LOCATION SIMPLE: LEFT UPPER BACK

## 2023-12-20 ASSESSMENT — LOCATION DETAILED DESCRIPTION DERM: LOCATION DETAILED: LEFT SUPERIOR UPPER BACK

## 2023-12-20 NOTE — PROCEDURE: SUTURE REMOVAL (GLOBAL PERIOD)
Detail Level: Detailed
Add 19946 Cpt? (Important Note: In 2017 The Use Of 77072 Is Being Tracked By Cms To Determine Future Global Period Reimbursement For Global Periods): no

## 2023-12-20 NOTE — PROCEDURE: MIPS QUALITY
Quality 110: Preventive Care And Screening: Influenza Immunization: Influenza Immunization not Administered for Documented Reasons.
Detail Level: Detailed
Quality 130: Documentation Of Current Medications In The Medical Record: Current Medications Documented
Additional Notes: Pt denied pneumonia vaccine
Quality 226: Preventive Care And Screening: Tobacco Use: Screening And Cessation Intervention: Patient screened for tobacco use and is an ex/non-smoker
Quality 111:Pneumonia Vaccination Status For Older Adults: Patient received any pneumococcal conjugate or polysaccharide vaccine on or after their 60th birthday and before the end of the measurement period

## 2024-01-24 ENCOUNTER — APPOINTMENT (RX ONLY)
Dept: URBAN - METROPOLITAN AREA CLINIC 73 | Facility: CLINIC | Age: 79
Setting detail: DERMATOLOGY
End: 2024-01-24

## 2024-01-24 DIAGNOSIS — L20.89 OTHER ATOPIC DERMATITIS: ICD-10-CM | Status: INADEQUATELY CONTROLLED

## 2024-01-24 PROCEDURE — ? COUNSELING

## 2024-01-24 PROCEDURE — 99214 OFFICE O/P EST MOD 30 MIN: CPT

## 2024-01-24 PROCEDURE — ? PATIENT SPECIFIC COUNSELING

## 2024-01-24 PROCEDURE — ? PRESCRIPTION

## 2024-01-24 PROCEDURE — ? TREATMENT REGIMEN

## 2024-01-24 RX ORDER — DUPILUMAB 300 MG/2ML
INJECTION, SOLUTION SUBCUTANEOUS
Qty: 2 | Refills: 2 | Status: CANCELLED

## 2024-01-24 ASSESSMENT — LOCATION DETAILED DESCRIPTION DERM
LOCATION DETAILED: EPIGASTRIC SKIN
LOCATION DETAILED: LEFT SUPERIOR UPPER BACK

## 2024-01-24 ASSESSMENT — ITCH NUMERIC RATING SCALE: HOW SEVERE IS YOUR ITCHING?: 10

## 2024-01-24 ASSESSMENT — LOCATION SIMPLE DESCRIPTION DERM
LOCATION SIMPLE: ABDOMEN
LOCATION SIMPLE: LEFT UPPER BACK

## 2024-01-24 ASSESSMENT — SEVERITY ASSESSMENT 2020: SEVERITY 2020: SEVERE

## 2024-01-24 ASSESSMENT — BSA ECZEMA: % BODY COVERED IN ECZEMA: 20

## 2024-01-24 ASSESSMENT — LOCATION ZONE DERM: LOCATION ZONE: TRUNK

## 2024-01-24 NOTE — PROCEDURE: TREATMENT REGIMEN
Detail Level: Zone
Otc Regimen: [place] emollients
Continue Regimen: TAC 0.1% or clobetasol cr 0.05% apply to affected areas on body  bid x 7-10 days

## 2024-01-24 NOTE — PROCEDURE: PATIENT SPECIFIC COUNSELING
Detail Level: Detailed
- pt failed fluocinonide, clobetasol, TAC 0.1% and lido/amitryp/ketamine topicals\\n-bx = excoriation; labs were wnl (just low vit B12)\\n- trial of Dupixent - r/b/sed including dry eyes or irritation, uri, etc\\n- Disc can try dupixent for 3-6 months \\n- Dupixent enrollment form signed today.\\n- will call pt once dupixent samples come into the office\\n-(pt takes takhzyro for HAE as well - anti-kallikrein ab)

## 2024-01-24 NOTE — PROCEDURE: MIPS QUALITY
Quality 130: Documentation Of Current Medications In The Medical Record: Current Medications Documented
Quality 110: Preventive Care And Screening: Influenza Immunization: Influenza Immunization not Administered for Documented Reasons.
Detail Level: Detailed
Quality 226: Preventive Care And Screening: Tobacco Use: Screening And Cessation Intervention: Patient screened for tobacco use and is an ex/non-smoker
Quality 111:Pneumonia Vaccination Status For Older Adults: Patient received any pneumococcal conjugate or polysaccharide vaccine on or after their 60th birthday and before the end of the measurement period

## 2024-01-25 RX ORDER — PHARMACY COMPOUNDING ACCESSORY
EACH MISCELLANEOUS
Qty: 30 | Refills: 2 | Status: ERX

## 2024-01-25 RX ORDER — CLOBETASOL PROPIONATE 0.5 MG/G
CREAM TOPICAL
Qty: 60 | Refills: 1 | Status: ERX

## 2024-03-20 ENCOUNTER — APPOINTMENT (RX ONLY)
Dept: URBAN - METROPOLITAN AREA CLINIC 73 | Facility: CLINIC | Age: 79
Setting detail: DERMATOLOGY
End: 2024-03-20

## 2024-03-20 DIAGNOSIS — L20.89 OTHER ATOPIC DERMATITIS: ICD-10-CM

## 2024-03-20 PROCEDURE — ? COUNSELING

## 2024-03-20 PROCEDURE — 99214 OFFICE O/P EST MOD 30 MIN: CPT

## 2024-03-20 PROCEDURE — ? PRESCRIPTION

## 2024-03-20 PROCEDURE — ? PATIENT SPECIFIC COUNSELING

## 2024-03-20 PROCEDURE — ? TREATMENT REGIMEN

## 2024-03-20 RX ORDER — DUPILUMAB 300 MG/2ML
INJECTION, SOLUTION SUBCUTANEOUS
Qty: 2 | Refills: 5 | Status: ERX | COMMUNITY
Start: 2024-03-20

## 2024-03-20 RX ADMIN — DUPILUMAB: 300 INJECTION, SOLUTION SUBCUTANEOUS at 00:00

## 2024-03-20 ASSESSMENT — LOCATION DETAILED DESCRIPTION DERM
LOCATION DETAILED: LEFT SUPERIOR UPPER BACK
LOCATION DETAILED: EPIGASTRIC SKIN

## 2024-03-20 ASSESSMENT — LOCATION ZONE DERM: LOCATION ZONE: TRUNK

## 2024-03-20 ASSESSMENT — LOCATION SIMPLE DESCRIPTION DERM
LOCATION SIMPLE: LEFT UPPER BACK
LOCATION SIMPLE: ABDOMEN

## 2024-03-20 NOTE — PROCEDURE: PATIENT SPECIFIC COUNSELING
Detail Level: Detailed
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

## 2024-03-20 NOTE — PROCEDURE: TREATMENT REGIMEN
Detail Level: Zone
Otc Regimen: [place] emollients
Continue Regimen: Dupixent 300 mg/2 mL subcutaneous pen injector \\nQuantity: 2.0 Pen Needle  Days Supply: 28\\nSig: Inject 300mg subcutaneously every 2 weeks\\n\\n\\nTAC 0.1% or clobetasol cr 0.05% apply to affected areas on body  bid x 7-10 days

## 2024-05-22 ENCOUNTER — APPOINTMENT (RX ONLY)
Dept: URBAN - METROPOLITAN AREA CLINIC 73 | Facility: CLINIC | Age: 79
Setting detail: DERMATOLOGY
End: 2024-05-22

## 2024-05-22 DIAGNOSIS — L20.89 OTHER ATOPIC DERMATITIS: ICD-10-CM | Status: IMPROVED

## 2024-05-22 PROCEDURE — 99214 OFFICE O/P EST MOD 30 MIN: CPT

## 2024-05-22 PROCEDURE — ? PATIENT SPECIFIC COUNSELING

## 2024-05-22 PROCEDURE — ? PRESCRIPTION

## 2024-05-22 PROCEDURE — ? TREATMENT REGIMEN

## 2024-05-22 PROCEDURE — ? COUNSELING

## 2024-05-22 RX ORDER — DUPILUMAB 300 MG/2ML
INJECTION, SOLUTION SUBCUTANEOUS
Qty: 2 | Refills: 2 | Status: ERX

## 2024-05-22 ASSESSMENT — LOCATION SIMPLE DESCRIPTION DERM
LOCATION SIMPLE: ABDOMEN
LOCATION SIMPLE: LEFT UPPER BACK

## 2024-05-22 ASSESSMENT — BSA ECZEMA: % BODY COVERED IN ECZEMA: 8

## 2024-05-22 ASSESSMENT — LOCATION ZONE DERM: LOCATION ZONE: TRUNK

## 2024-05-22 ASSESSMENT — ITCH NUMERIC RATING SCALE: HOW SEVERE IS YOUR ITCHING?: 3

## 2024-05-22 ASSESSMENT — LOCATION DETAILED DESCRIPTION DERM
LOCATION DETAILED: LEFT SUPERIOR UPPER BACK
LOCATION DETAILED: EPIGASTRIC SKIN

## 2024-05-22 ASSESSMENT — SEVERITY ASSESSMENT 2020: SEVERITY 2020: MILD

## 2024-05-22 NOTE — PROCEDURE: TREATMENT REGIMEN
Initiate Treatment: Dupixent 300 mg/2 mL subcutaneous pen injector \\nSig: Inject 300mg subcutaneously every 2 weeks
Detail Level: Zone

## 2024-05-22 NOTE — PROCEDURE: PATIENT SPECIFIC COUNSELING
Detail Level: Detailed
-pt reports she is much better and much less itchy w/ Dupixent - will continue; prev labs wnl\\n-pt denies side effects on Dupixent (once had rash that traveled down injection site but did not have it recur after more doses - will monitor)\\n-scalp also much improved - used neuropathic cream prn now- lido/\\namitryp/ketamine \\n-(prev bx = excoriation; prev itch labs were wnl (just low vit B12))\\n- rtc 3mo

## 2024-08-21 ENCOUNTER — APPOINTMENT (RX ONLY)
Dept: URBAN - METROPOLITAN AREA CLINIC 73 | Facility: CLINIC | Age: 79
Setting detail: DERMATOLOGY
End: 2024-08-21

## 2024-08-21 DIAGNOSIS — L20.89 OTHER ATOPIC DERMATITIS: ICD-10-CM

## 2024-08-21 PROCEDURE — ? PRESCRIPTION

## 2024-08-21 PROCEDURE — ? COUNSELING

## 2024-08-21 PROCEDURE — 99214 OFFICE O/P EST MOD 30 MIN: CPT

## 2024-08-21 PROCEDURE — ? TREATMENT REGIMEN

## 2024-08-21 PROCEDURE — ? PATIENT SPECIFIC COUNSELING

## 2024-08-21 RX ORDER — DUPILUMAB 300 MG/2ML
INJECTION, SOLUTION SUBCUTANEOUS
Qty: 2 | Refills: 5 | Status: ERX

## 2024-08-21 RX ORDER — CLOBETASOL PROPIONATE 0.5 MG/G
CREAM TOPICAL
Qty: 60 | Refills: 2 | Status: ERX

## 2024-08-21 ASSESSMENT — SEVERITY ASSESSMENT 2020: SEVERITY 2020: MILD

## 2024-08-21 ASSESSMENT — LOCATION DETAILED DESCRIPTION DERM
LOCATION DETAILED: LEFT SUPERIOR UPPER BACK
LOCATION DETAILED: EPIGASTRIC SKIN

## 2024-08-21 ASSESSMENT — LOCATION ZONE DERM: LOCATION ZONE: TRUNK

## 2024-08-21 ASSESSMENT — BSA ECZEMA: % BODY COVERED IN ECZEMA: 8

## 2024-08-21 ASSESSMENT — LOCATION SIMPLE DESCRIPTION DERM
LOCATION SIMPLE: ABDOMEN
LOCATION SIMPLE: LEFT UPPER BACK

## 2024-08-21 NOTE — PROCEDURE: PATIENT SPECIFIC COUNSELING
Detail Level: Detailed
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

## 2024-08-21 NOTE — PROCEDURE: TREATMENT REGIMEN
Otc Regimen: [place] emollients/gentle moisturizers
Continue Regimen: Dupixent 300 mg/2 mL subcutaneous pen injector : Inject 300mg subcutaneously every 2 weeks\\nClobetasol cr apply to affected areas on body bid x 7-10 days
Detail Level: Zone

## 2024-08-21 NOTE — PROCEDURE: MIPS QUALITY
Name And Contact Information For Health Care Proxy: Unknown
Quality 130: Documentation Of Current Medications In The Medical Record: Current Medications Documented
Detail Level: Detailed
Quality 47: Advance Care Plan: Advance Care Planning discussed and documented; advance care plan or surrogate decision maker documented in the medical record.

## 2024-09-18 ENCOUNTER — APPOINTMENT (RX ONLY)
Dept: URBAN - METROPOLITAN AREA CLINIC 73 | Facility: CLINIC | Age: 79
Setting detail: DERMATOLOGY
End: 2024-09-18

## 2024-09-18 DIAGNOSIS — B07.8 OTHER VIRAL WARTS: ICD-10-CM

## 2024-09-18 DIAGNOSIS — L82.1 OTHER SEBORRHEIC KERATOSIS: ICD-10-CM

## 2024-09-18 DIAGNOSIS — L81.4 OTHER MELANIN HYPERPIGMENTATION: ICD-10-CM

## 2024-09-18 DIAGNOSIS — L84 CORNS AND CALLOSITIES: ICD-10-CM

## 2024-09-18 DIAGNOSIS — L57.0 ACTINIC KERATOSIS: ICD-10-CM

## 2024-09-18 DIAGNOSIS — D18.0 HEMANGIOMA: ICD-10-CM

## 2024-09-18 DIAGNOSIS — D22 MELANOCYTIC NEVI: ICD-10-CM

## 2024-09-18 DIAGNOSIS — L20.89 OTHER ATOPIC DERMATITIS: ICD-10-CM

## 2024-09-18 PROBLEM — D22.5 MELANOCYTIC NEVI OF TRUNK: Status: ACTIVE | Noted: 2024-09-18

## 2024-09-18 PROBLEM — D18.01 HEMANGIOMA OF SKIN AND SUBCUTANEOUS TISSUE: Status: ACTIVE | Noted: 2024-09-18

## 2024-09-18 PROBLEM — D22.72 MELANOCYTIC NEVI OF LEFT LOWER LIMB, INCLUDING HIP: Status: ACTIVE | Noted: 2024-09-18

## 2024-09-18 PROCEDURE — ? COUNSELING

## 2024-09-18 PROCEDURE — ? BENIGN DESTRUCTION

## 2024-09-18 PROCEDURE — 17000 DESTRUCT PREMALG LESION: CPT | Mod: 59

## 2024-09-18 PROCEDURE — ? PATIENT SPECIFIC COUNSELING

## 2024-09-18 PROCEDURE — ? TREATMENT REGIMEN

## 2024-09-18 PROCEDURE — 17003 DESTRUCT PREMALG LES 2-14: CPT | Mod: 59

## 2024-09-18 PROCEDURE — ? OBSERVATION AND MEASURE

## 2024-09-18 PROCEDURE — ? LIQUID NITROGEN

## 2024-09-18 PROCEDURE — 99213 OFFICE O/P EST LOW 20 MIN: CPT | Mod: 25

## 2024-09-18 PROCEDURE — 17110 DESTRUCTION B9 LES UP TO 14: CPT

## 2024-09-18 ASSESSMENT — LOCATION SIMPLE DESCRIPTION DERM
LOCATION SIMPLE: LEFT KNEE
LOCATION SIMPLE: CHEST
LOCATION SIMPLE: RIGHT UPPER BACK
LOCATION SIMPLE: LEFT THIGH
LOCATION SIMPLE: RIGHT PLANTAR SURFACE
LOCATION SIMPLE: LEFT GREAT TOE
LOCATION SIMPLE: LEFT UPPER BACK
LOCATION SIMPLE: POSTERIOR NECK
LOCATION SIMPLE: ABDOMEN
LOCATION SIMPLE: LEFT PLANTAR SURFACE
LOCATION SIMPLE: RIGHT PRETIBIAL REGION

## 2024-09-18 ASSESSMENT — LOCATION DETAILED DESCRIPTION DERM
LOCATION DETAILED: RIGHT PLANTAR FOREFOOT OVERLYING 4TH METATARSAL
LOCATION DETAILED: LEFT PLANTAR FOREFOOT OVERLYING 2ND METATARSAL
LOCATION DETAILED: LEFT SUPERIOR UPPER BACK
LOCATION DETAILED: LEFT KNEE
LOCATION DETAILED: RIGHT LATERAL TRAPEZIAL NECK
LOCATION DETAILED: LEFT ANTERIOR DISTAL THIGH
LOCATION DETAILED: EPIGASTRIC SKIN
LOCATION DETAILED: LEFT MEDIAL SUPERIOR CHEST
LOCATION DETAILED: LEFT LATERAL GREAT TOE
LOCATION DETAILED: RIGHT PROXIMAL PRETIBIAL REGION
LOCATION DETAILED: RIGHT MID-UPPER BACK
LOCATION DETAILED: RIGHT MEDIAL SUPERIOR CHEST
LOCATION DETAILED: LEFT LATERAL SUPERIOR CHEST

## 2024-09-18 ASSESSMENT — LOCATION ZONE DERM
LOCATION ZONE: NECK
LOCATION ZONE: TRUNK
LOCATION ZONE: LEG
LOCATION ZONE: FEET
LOCATION ZONE: TOE

## 2024-09-18 NOTE — PROCEDURE: PATIENT SPECIFIC COUNSELING
Detail Level: Detailed
Pt reports improvement with dupixent and prn clobetasol/skin care
Pared with 4mm curette\\nRec decreasing pressure on area and insoles for shoes

## 2024-09-18 NOTE — PROCEDURE: OBSERVATION
Detail Level: Detailed
Size Of Lesion: 2.5mm
Size Of Lesion: 4mm
Body Location Override (Optional - Billing Will Still Be Based On Selected Body Map Location If Applicable): across right leg
Size Of Lesion: 2-4mm

## 2024-09-18 NOTE — PROCEDURE: BENIGN DESTRUCTION
Medical Necessity Clause: This procedure was medically necessary because the lesions that were treated were: inflamed, irritated, tender
Number Of Freeze-Thaw Cycles: 3 freeze-thaw cycles
Include Z78.9 (Other Specified Conditions Influencing Health Status) As An Associated Diagnosis?: No
Post-Care Instructions: I reviewed with the patient in detail post-care instructions. Patient is to wear sunprotection, and avoid picking at any of the treated lesions. Pt may apply Vaseline to crusted or scabbing areas.\\nRTC prn non-resolution
Consent: The patient's consent was obtained including but not limited to risks of crusting, scabbing, blistering, scarring, darker or lighter pigmentary change, recurrence, incomplete removal and infection.
Medical Necessity Information: It is in your best interest to select a reason for this procedure from the list below. All of these items fulfill various CMS LCD requirements except the new and changing color options.
Detail Level: Detailed
Treatment Number (Will Not Render If 0): 0

## 2024-09-18 NOTE — PROCEDURE: LIQUID NITROGEN
Consent: The patient's consent was obtained including but not limited to risks of crusting, scabbing, blistering, scarring, darker or lighter pigmentary change, recurrence, incomplete removal and infection.
Duration Of Freeze Thaw-Cycle (Seconds): 4
Render Post-Care Instructions In Note?: no
Number Of Freeze-Thaw Cycles: 2 freeze-thaw cycles
Application Tool (Optional): Cry-AC
Post-Care Instructions: I reviewed with the patient in detail post-care instructions. Patient is to wear sunprotection, and avoid picking at any of the treated lesions. Pt may apply Vaseline to crusted or scabbing areas.
Show Applicator Variable?: Yes
Aperture Size (Optional): C
Detail Level: Detailed

## 2024-09-18 NOTE — PROCEDURE: TREATMENT REGIMEN
Otc Regimen: Yell emollients/gentle moisturizers
Continue Regimen: Dupixent 300 mg/2 mL subcutaneous pen injector : Inject 300mg subcutaneously every 2 weeks
Detail Level: Zone

## 2025-01-22 RX ORDER — DUPILUMAB 300 MG/2ML
INJECTION, SOLUTION SUBCUTANEOUS
Qty: 4 | Refills: 0 | Status: ERX

## 2025-02-19 ENCOUNTER — APPOINTMENT (OUTPATIENT)
Dept: URBAN - METROPOLITAN AREA CLINIC 73 | Facility: CLINIC | Age: 80
Setting detail: DERMATOLOGY
End: 2025-02-19

## 2025-02-19 DIAGNOSIS — L20.89 OTHER ATOPIC DERMATITIS: ICD-10-CM

## 2025-02-19 PROCEDURE — ? MEDICATION COUNSELING

## 2025-02-19 PROCEDURE — ? COUNSELING

## 2025-02-19 PROCEDURE — ? PATIENT SPECIFIC COUNSELING

## 2025-02-19 PROCEDURE — ? TREATMENT REGIMEN

## 2025-02-19 PROCEDURE — ? PRESCRIPTION

## 2025-02-19 PROCEDURE — 99214 OFFICE O/P EST MOD 30 MIN: CPT

## 2025-02-19 RX ORDER — PHARMACY COMPOUNDING ACCESSORY
EACH MISCELLANEOUS
Qty: 30 | Refills: 1 | Status: ERX | COMMUNITY
Start: 2025-02-19

## 2025-02-19 RX ORDER — NEMOLIZUMAB-ILTO 30 MG/100MG
INJECTION, POWDER, LYOPHILIZED, FOR SOLUTION SUBCUTANEOUS
Qty: 2 | Refills: 0 | Status: ERX

## 2025-02-19 RX ORDER — CLOBETASOL PROPIONATE 0.5 MG/G
CREAM TOPICAL
Qty: 60 | Refills: 1 | Status: ERX

## 2025-02-19 RX ORDER — NEMOLIZUMAB-ILTO 30 MG/100MG
INJECTION, POWDER, LYOPHILIZED, FOR SOLUTION SUBCUTANEOUS
Qty: 1 | Refills: 2 | Status: ERX | COMMUNITY
Start: 2025-02-19

## 2025-02-19 RX ADMIN — Medication: at 00:00

## 2025-02-19 RX ADMIN — NEMOLIZUMAB-ILTO: 30 INJECTION, POWDER, LYOPHILIZED, FOR SOLUTION SUBCUTANEOUS at 00:00

## 2025-02-19 ASSESSMENT — LOCATION DETAILED DESCRIPTION DERM
LOCATION DETAILED: LEFT INFERIOR POSTERIOR NECK
LOCATION DETAILED: INFERIOR THORACIC SPINE
LOCATION DETAILED: LEFT SUPERIOR OCCIPITAL SCALP
LOCATION DETAILED: LEFT RIB CAGE

## 2025-02-19 ASSESSMENT — LOCATION SIMPLE DESCRIPTION DERM
LOCATION SIMPLE: UPPER BACK
LOCATION SIMPLE: ABDOMEN
LOCATION SIMPLE: POSTERIOR NECK
LOCATION SIMPLE: POSTERIOR SCALP

## 2025-02-19 ASSESSMENT — LOCATION ZONE DERM
LOCATION ZONE: NECK
LOCATION ZONE: TRUNK
LOCATION ZONE: SCALP

## 2025-02-19 ASSESSMENT — BSA ECZEMA: % BODY COVERED IN ECZEMA: 5

## 2025-02-19 ASSESSMENT — SEVERITY ASSESSMENT 2020: SEVERITY 2020: MODERATE

## 2025-02-19 NOTE — PROCEDURE: TREATMENT REGIMEN
Initiate Treatment: Nemluvio inject 60mg (2 pens) subcutaneously at week 0 \\nNemluvio inject 30mg (1 pen) subcutaneously every 4 weeks
Otc Regimen: [place] emollients/ gentle moisturizer
Detail Level: Zone
Samples Given: Dupxient x 1 pen LOT [id number] exp 4/30/2026
Continue Regimen: Clobetasol cream 0.05% apply to affected areas on body bid x 7-10 days\\n\\nLidocaine 2%/Amitriptylline 2%/ ketamine 1% Apply to affected areas on body bid

## 2025-02-19 NOTE — PROCEDURE: MEDICATION COUNSELING
Topical Sulfur Applications Pregnancy And Lactation Text: This medication is Pregnancy Category C and has an unknown safety profile during pregnancy. It is unknown if this topical medication is excreted in breast milk.
Humira Pregnancy And Lactation Text: This medication is Pregnancy Category B and is considered safe during pregnancy. It is unknown if this medication is excreted in breast milk.
Dutasteride Male Counseling: Dustasteride Counseling:  I discussed with the patient the risks of use of dutasteride including but not limited to decreased libido, decreased ejaculate volume, and gynecomastia. Women who can become pregnant should not handle medication.  All of the patient's questions and concerns were addressed.
Rifampin Pregnancy And Lactation Text: This medication is Pregnancy Category C and it isn't know if it is safe during pregnancy. It is also excreted in breast milk and should not be used if you are breast feeding.
Low Dose Naltrexone Pregnancy And Lactation Text: Naltrexone is pregnancy category C.  There have been no adequate and well-controlled studies in pregnant women.  It should be used in pregnancy only if the potential benefit justifies the potential risk to the fetus.   Limited data indicates that naltrexone is minimally excreted into breastmilk.
Skyrizi Pregnancy And Lactation Text: The risk during pregnancy and breastfeeding is uncertain with this medication.
Doxepin Counseling:  Patient advised that the medication is sedating and not to drive a car after taking this medication. Patient informed of potential adverse effects including but not limited to dry mouth, urinary retention, and blurry vision.  The patient verbalized understanding of the proper use and possible adverse effects of doxepin.  All of the patient's questions and concerns were addressed.
Thalidomide Pregnancy And Lactation Text: This medication is Pregnancy Category X and is absolutely contraindicated during pregnancy. It is unknown if it is excreted in breast milk.
Drysol Counseling:  I discussed with the patient the risks of drysol/aluminum chloride including but not limited to skin rash, itching, irritation, burning.
Ivermectin Counseling:  Patient instructed to take medication on an empty stomach with a full glass of water.  Patient informed of potential adverse effects including but not limited to nausea, diarrhea, dizziness, itching, and swelling of the extremities or lymph nodes.  The patient verbalized understanding of the proper use and possible adverse effects of ivermectin.  All of the patient's questions and concerns were addressed.
Xelfannyz Pregnancy And Lactation Text: This medication is Pregnancy Category D and is not considered safe during pregnancy.  The risk during breast feeding is also uncertain.
Rhofade Pregnancy And Lactation Text: This medication has not been assigned a Pregnancy Risk Category. It is unknown if the medication is excreted in breast milk.
Colchicine Counseling:  Patient counseled regarding adverse effects including but not limited to stomach upset (nausea, vomiting, stomach pain, or diarrhea).  Patient instructed to limit alcohol consumption while taking this medication.  Colchicine may reduce blood counts especially with prolonged use.  The patient understands that monitoring of kidney function and blood counts may be required, especially at baseline. The patient verbalized understanding of the proper use and possible adverse effects of colchicine.  All of the patient's questions and concerns were addressed.
Azithromycin Pregnancy And Lactation Text: This medication is considered safe during pregnancy and is also secreted in breast milk.
Itraconazole Counseling:  I discussed with the patient the risks of itraconazole including but not limited to liver damage, nausea/vomiting, neuropathy, and severe allergy.  The patient understands that this medication is best absorbed when taken with acidic beverages such as non-diet cola or ginger ale.  The patient understands that monitoring is required including baseline LFTs and repeat LFTs at intervals.  The patient understands that they are to contact us or the primary physician if concerning signs are noted.
Hydroquinone Pregnancy And Lactation Text: This medication has not been assigned a Pregnancy Risk Category but animal studies failed to show danger with the topical medication. It is unknown if the medication is excreted in breast milk.
Tranexamic Acid Counseling:  Patient advised of the small risk of bleeding problems with tranexamic acid. They were also instructed to call if they developed any nausea, vomiting or diarrhea. All of the patient's questions and concerns were addressed.
Niacinamide Counseling: I recommended taking niacin or niacinamide, also know as vitamin B3, twice daily. Recent evidence suggests that taking vitamin B3 (500 mg twice daily) can reduce the risk of actinic keratoses and non-melanoma skin cancers. Side effects of vitamin B3 include flushing and headache.
Bactrim Counseling:  I discussed with the patient the risks of sulfa antibiotics including but not limited to GI upset, allergic reaction, drug rash, diarrhea, dizziness, photosensitivity, and yeast infections.  Rarely, more serious reactions can occur including but not limited to aplastic anemia, agranulocytosis, methemoglobinemia, blood dyscrasias, liver or kidney failure, lung infiltrates or desquamative/blistering drug rashes.
Solaraze Counseling:  I discussed with the patient the risks of Solaraze including but not limited to erythema, scaling, itching, weeping, crusting, and pain.
Sarecycline Counseling: Patient advised regarding possible photosensitivity and discoloration of the teeth, skin, lips, tongue and gums.  Patient instructed to avoid sunlight, if possible.  When exposed to sunlight, patients should wear protective clothing, sunglasses, and sunscreen.  The patient was instructed to call the office immediately if the following severe adverse effects occur:  hearing changes, easy bruising/bleeding, severe headache, or vision changes.  The patient verbalized understanding of the proper use and possible adverse effects of sarecycline.  All of the patient's questions and concerns were addressed.
Colchicine Pregnancy And Lactation Text: This medication is Pregnancy Category C and isn't considered safe during pregnancy. It is excreted in breast milk.
Ivermectin Pregnancy And Lactation Text: This medication is Pregnancy Category C and it isn't known if it is safe during pregnancy. It is also excreted in breast milk.
Spevigo Counseling: I discussed with the patient the risks of Spevigo including but not limited to fatigue, nasuea, vomiting, headache, pruritus, urinary tract infection, an infusion related reactions.  The patient understands that monitoring is required including screening for tuberculosis at baseline and yearly screening thereafter while continuing Spevigo therapy. They should contact us if symptoms of infection or other concerning signs are noted.
Wartpeel Counseling:  I discussed with the patient the risks of Wartpeel including but not limited to erythema, scaling, itching, weeping, crusting, and pain.
Doxepin Pregnancy And Lactation Text: This medication is Pregnancy Category C and it isn't known if it is safe during pregnancy. It is also excreted in breast milk and breast feeding isn't recommended.
Dutasteride Female Counseling: Dutasteride Counseling:  I discussed with the patient the risks of use of dutasteride including but not limited to decreased libido and sexual dysfunction. Explained the teratogenic nature of the medication and stressed the importance of not getting pregnant during treatment. All of the patient's questions and concerns were addressed.
Drysol Pregnancy And Lactation Text: This medication is considered safe during pregnancy and breast feeding.
Itraconazole Pregnancy And Lactation Text: This medication is Pregnancy Category C and it isn't know if it is safe during pregnancy. It is also excreted in breast milk.
Hyrimoz Counseling:  I discussed with the patient the risks of adalimumab including but not limited to myelosuppression, immunosuppression, autoimmune hepatitis, demyelinating diseases, lymphoma, and serious infections.  The patient understands that monitoring is required including a PPD at baseline and must alert us or the primary physician if symptoms of infection or other concerning signs are noted.
Imiquimod Counseling:  I discussed with the patient the risks of imiquimod including but not limited to erythema, scaling, itching, weeping, crusting, and pain.  Patient understands that the inflammatory response to imiquimod is variable from person to person and was educated regarded proper titration schedule.  If flu-like symptoms develop, patient knows to discontinue the medication and contact us.
Spevigo Pregnancy And Lactation Text: The risk during pregnancy and breastfeeding is uncertain with this medication. This medication does cross the placenta. It is unknown if this medication is found in breast milk.
Bactrim Pregnancy And Lactation Text: This medication is Pregnancy Category D and is known to cause fetal risk.  It is also excreted in breast milk.
Wartpeel Pregnancy And Lactation Text: This medication is Pregnancy Category X and contraindicated in pregnancy and in women who may become pregnant. It is unknown if this medication is excreted in breast milk.
Tranexamic Acid Pregnancy And Lactation Text: It is unknown if this medication is safe during pregnancy or breast feeding.
Niacinamide Pregnancy And Lactation Text: These medications are considered safe during pregnancy.
Dapsone Counseling: I discussed with the patient the risks of dapsone including but not limited to hemolytic anemia, agranulocytosis, rashes, methemoglobinemia, kidney failure, peripheral neuropathy, headaches, GI upset, and liver toxicity.  Patients who start dapsone require monitoring including baseline LFTs and weekly CBCs for the first month, then every month thereafter.  The patient verbalized understanding of the proper use and possible adverse effects of dapsone.  All of the patient's questions and concerns were addressed.
Dutasteride Pregnancy And Lactation Text: This medication is absolutely contraindicated in women, especially during pregnancy and breast feeding. Feminization of male fetuses is possible if taking while pregnant.
Solaraze Pregnancy And Lactation Text: This medication is Pregnancy Category B and is considered safe. There is some data to suggest avoiding during the third trimester. It is unknown if this medication is excreted in breast milk.
Sarecycline Pregnancy And Lactation Text: This medication is Pregnancy Category D and not consider safe during pregnancy. It is also excreted in breast milk.
Erivedge Counseling- I discussed with the patient the risks of Erivedge including but not limited to nausea, vomiting, diarrhea, constipation, weight loss, changes in the sense of taste, decreased appetite, muscle spasms, and hair loss.  The patient verbalized understanding of the proper use and possible adverse effects of Erivedge.  All of the patient's questions and concerns were addressed.
Ketoconazole Counseling:   Patient counseled regarding improving absorption with orange juice.  Adverse effects include but are not limited to breast enlargement, headache, diarrhea, nausea, upset stomach, liver function test abnormalities, taste disturbance, and stomach pain.  There is a rare possibility of liver failure that can occur when taking ketoconazole. The patient understands that monitoring of LFTs may be required, especially at baseline. The patient verbalized understanding of the proper use and possible adverse effects of ketoconazole.  All of the patient's questions and concerns were addressed.
Elidel Counseling: Patient may experience a mild burning sensation during topical application. Elidel is not approved in children less than 2 years of age. There have been case reports of hematologic and skin malignancies in patients using topical calcineurin inhibitors although causality is questionable.
Nsaids Counseling: NSAID Counseling: I discussed with the patient that NSAIDs should be taken with food. Prolonged use of NSAIDs can result in the development of stomach ulcers.  Patient advised to stop taking NSAIDs if abdominal pain occurs.  The patient verbalized understanding of the proper use and possible adverse effects of NSAIDs.  All of the patient's questions and concerns were addressed.
Opioid Counseling: I discussed with the patient the potential side effects of opioids including but not limited to addiction, altered mental status, and depression. I stressed avoiding alcohol, benzodiazepines, muscle relaxants and sleep aids unless specifically okayed by a physician. The patient verbalized understanding of the proper use and possible adverse effects of opioids. All of the patient's questions and concerns were addressed. They were instructed to flush the remaining pills down the toilet if they did not need them for pain.
Imiquimod Pregnancy And Lactation Text: This medication is Pregnancy Category C. It is unknown if this medication is excreted in breast milk.
Tetracycline Counseling: Patient counseled regarding possible photosensitivity and increased risk for sunburn.  Patient instructed to avoid sunlight, if possible.  When exposed to sunlight, patients should wear protective clothing, sunglasses, and sunscreen.  The patient was instructed to call the office immediately if the following severe adverse effects occur:  hearing changes, easy bruising/bleeding, severe headache, or vision changes.  The patient verbalized understanding of the proper use and possible adverse effects of tetracycline.  All of the patient's questions and concerns were addressed. Patient understands to avoid pregnancy while on therapy due to potential birth defects.
Winlevi Counseling:  I discussed with the patient the risks of topical clascoterone including but not limited to erythema, scaling, itching, and stinging. Patient voiced their understanding.
Ilumya Counseling: I discussed with the patient the risks of tildrakizumab including but not limited to immunosuppression, malignancy, posterior leukoencephalopathy syndrome, and serious infections.  The patient understands that monitoring is required including a PPD at baseline and must alert us or the primary physician if symptoms of infection or other concerning signs are noted.
Finasteride Male Counseling: Finasteride Counseling:  I discussed with the patient the risks of use of finasteride including but not limited to decreased libido, decreased ejaculate volume, gynecomastia, and depression. Women should not handle medication.  All of the patient's questions and concerns were addressed.
Valtrex Counseling: I discussed with the patient the risks of valacyclovir including but not limited to kidney damage, nausea, vomiting and severe allergy.  The patient understands that if the infection seems to be worsening or is not improving, they are to call.
Soolantra Counseling: I discussed with the patients the risks of topial Soolantra. This is a medicine which decreases the number of mites and inflammation in the skin. You experience burning, stinging, eye irritation or allergic reactions.  Please call our office if you develop any problems from using this medication.
Stelara Counseling:  I discussed with the patient the risks of ustekinumab including but not limited to immunosuppression, malignancy, posterior leukoencephalopathy syndrome, and serious infections.  The patient understands that monitoring is required including a PPD at baseline and must alert us or the primary physician if symptoms of infection or other concerning signs are noted.
Ozempic Counseling: I reviewed the possible side effects including: thyroid tumors, kidney disease, gallbladder disease, abdominal pain, constipation, diarrhea, nausea, vomiting and pancreatitis. Do not take this medication if you have a history or family history of multiple endocrine neoplasia syndrome type 2. Side effects reviewed, pt to contact office should one occur.
Cephalexin Counseling: I counseled the patient regarding use of cephalexin as an antibiotic for prophylactic and/or therapeutic purposes. Cephalexin (commonly prescribed under brand name Keflex) is a cephalosporin antibiotic which is active against numerous classes of bacteria, including most skin bacteria. Side effects may include nausea, diarrhea, gastrointestinal upset, rash, hives, yeast infections, and in rare cases, hepatitis, kidney disease, seizures, fever, confusion, neurologic symptoms, and others. Patients with severe allergies to penicillin medications are cautioned that there is about a 10% incidence of cross-reactivity with cephalosporins. When possible, patients with penicillin allergies should use alternatives to cephalosporins for antibiotic therapy.
Adbry Counseling: I discussed with the patient the risks of tralokinumab including but not limited to eye infection and irritation, cold sores, injection site reactions, worsening of asthma, allergic reactions and increased risk of parasitic infection.  Live vaccines should be avoided while taking tralokinumab. The patient understands that monitoring is required and they must alert us or the primary physician if symptoms of infection or other concerning signs are noted.
Dapsone Pregnancy And Lactation Text: This medication is Pregnancy Category C and is not considered safe during pregnancy or breast feeding.
Opioid Pregnancy And Lactation Text: These medications can lead to premature delivery and should be avoided during pregnancy. These medications are also present in breast milk in small amounts.
Ketoconazole Pregnancy And Lactation Text: This medication is Pregnancy Category C and it isn't know if it is safe during pregnancy. It is also excreted in breast milk and breast feeding isn't recommended.
Klisyri Counseling:  I discussed with the patient the risks of Klisyri including but not limited to erythema, scaling, itching, weeping, crusting, and pain.
Finasteride Female Counseling: Finasteride Counseling:  I discussed with the patient the risks of use of finasteride including but not limited to decreased libido and sexual dysfunction. Explained the teratogenic nature of the medication and stressed the importance of not getting pregnant during treatment. All of the patient's questions and concerns were addressed.
Soolantra Pregnancy And Lactation Text: This medication is Pregnancy Category C. This medication is considered safe during breast feeding.
Nsaids Pregnancy And Lactation Text: These medications are considered safe up to 30 weeks gestation. It is excreted in breast milk.
Valtrex Pregnancy And Lactation Text: this medication is Pregnancy Category B and is considered safe during pregnancy. This medication is not directly found in breast milk but it's metabolite acyclovir is present.
Eucrisa Counseling: Patient may experience a mild burning sensation during topical application. Eucrisa is not approved in children less than 2 years of age.
Adbry Pregnancy And Lactation Text: It is unknown if this medication will adversely affect pregnancy or breast feeding.
Gabapentin Counseling: I discussed with the patient the risks of gabapentin including but not limited to dizziness, somnolence, fatigue and ataxia.
Acitretin Counseling:  I discussed with the patient the risks of acitretin including but not limited to hair loss, dry lips/skin/eyes, liver damage, hyperlipidemia, depression/suicidal ideation, photosensitivity.  Serious rare side effects can include but are not limited to pancreatitis, pseudotumor cerebri, bony changes, clot formation/stroke/heart attack.  Patient understands that alcohol is contraindicated since it can result in liver toxicity and significantly prolong the elimination of the drug by many years.
Winlevi Pregnancy And Lactation Text: This medication is considered safe during pregnancy and breastfeeding.
Cephalexin Pregnancy And Lactation Text: This medication is Pregnancy Category B and considered safe during pregnancy.  It is also excreted in breast milk but can be used safely for shorter doses.
Protopic Counseling: Patient may experience a mild burning sensation during topical application. Protopic is not approved in children less than 2 years of age. There have been case reports of hematologic and skin malignancies in patients using topical calcineurin inhibitors although causality is questionable.
Methotrexate Counseling:  Patient counseled regarding adverse effects of methotrexate including but not limited to nausea, vomiting, abnormalities in liver function tests. Patients may develop mouth sores, rash, diarrhea, and abnormalities in blood counts. The patient understands that monitoring is required including LFT's and blood counts.  There is a rare possibility of scarring of the liver and lung problems that can occur when taking methotrexate. Persistent nausea, loss of appetite, pale stools, dark urine, cough, and shortness of breath should be reported immediately. Patient advised to discontinue methotrexate treatment at least three months before attempting to become pregnant.  I discussed the need for folate supplements while taking methotrexate.  These supplements can decrease side effects during methotrexate treatment. The patient verbalized understanding of the proper use and possible adverse effects of methotrexate.  All of the patient's questions and concerns were addressed.
Include Pregnancy/Lactation Warning?: No
Ebglyss Pregnancy And Lactation Text: This medication likely crosses the placenta but the risk for the fetus is uncertain. It is unknown if this medication is excreted in breast milk.
Cyclosporine Counseling:  I discussed with the patient the risks of cyclosporine including but not limited to hypertension, gingival hyperplasia,myelosuppression, immunosuppression, liver damage, kidney damage, neurotoxicity, lymphoma, and serious infections. The patient understands that monitoring is required including baseline blood pressure, CBC, CMP, lipid panel and uric acid, and then 1-2 times monthly CMP and blood pressure.
Topical Metronidazole Pregnancy And Lactation Text: This medication is Pregnancy Category B and considered safe during pregnancy.  It is also considered safe to use while breastfeeding.
Propranolol Pregnancy And Lactation Text: This medication is Pregnancy Category C and it isn't known if it is safe during pregnancy. It is excreted in breast milk.
Calcipotriene Pregnancy And Lactation Text: The use of this medication during pregnancy or lactation is not recommended as there is insufficient data.
Olumiant Pregnancy And Lactation Text: Based on animal studies, Olumiant may cause embryo-fetal harm when administered to pregnant women.  The medication should not be used in pregnancy.  Breastfeeding is not recommended during treatment.
Protopic Pregnancy And Lactation Text: This medication is Pregnancy Category C. It is unknown if this medication is excreted in breast milk when applied topically.
Arava Counseling:  Patient counseled regarding adverse effects of Arava including but not limited to nausea, vomiting, abnormalities in liver function tests. Patients may develop mouth sores, rash, diarrhea, and abnormalities in blood counts. The patient understands that monitoring is required including LFTs and blood counts.  There is a rare possibility of scarring of the liver and lung problems that can occur when taking methotrexate. Persistent nausea, loss of appetite, pale stools, dark urine, cough, and shortness of breath should be reported immediately. Patient advised to discontinue Arava treatment and consult with a physician prior to attempting conception. The patient will have to undergo a treatment to eliminate Arava from the body prior to conception.
Fluconazole Counseling:  Patient counseled regarding adverse effects of fluconazole including but not limited to headache, diarrhea, nausea, upset stomach, liver function test abnormalities, taste disturbance, and stomach pain.  There is a rare possibility of liver failure that can occur when taking fluconazole.  The patient understands that monitoring of LFTs and kidney function test may be required, especially at baseline. The patient verbalized understanding of the proper use and possible adverse effects of fluconazole.  All of the patient's questions and concerns were addressed.
SSKI Counseling:  I discussed with the patient the risks of SSKI including but not limited to thyroid abnormalities, metallic taste, GI upset, fever, headache, acne, arthralgias, paraesthesias, lymphadenopathy, easy bleeding, arrhythmias, and allergic reaction.
Cyclosporine Pregnancy And Lactation Text: This medication is Pregnancy Category C and it isn't know if it is safe during pregnancy. This medication is excreted in breast milk.
Hydroxychloroquine Counseling:  I discussed with the patient that a baseline ophthalmologic exam is needed at the start of therapy and every year thereafter while on therapy. A CBC may also be warranted for monitoring.  The side effects of this medication were discussed with the patient, including but not limited to agranulocytosis, aplastic anemia, seizures, rashes, retinopathy, and liver toxicity. Patient instructed to call the office should any adverse effect occur.  The patient verbalized understanding of the proper use and possible adverse effects of Plaquenil.  All the patient's questions and concerns were addressed.
Methotrexate Pregnancy And Lactation Text: This medication is Pregnancy Category X and is known to cause fetal harm. This medication is excreted in breast milk.
Cantharidin Counseling:  I discussed with the patient the risks of Cantharidin including but not limited to pain, redness, burning, itching, and blistering.
Qbrexza Counseling:  I discussed with the patient the risks of Qbrexza including but not limited to headache, mydriasis, blurred vision, dry eyes, nasal dryness, dry mouth, dry throat, dry skin, urinary hesitation, and constipation.  Local skin reactions including erythema, burning, stinging, and itching can also occur.
Simponi Counseling:  I discussed with the patient the risks of golimumab including but not limited to myelosuppression, immunosuppression, autoimmune hepatitis, demyelinating diseases, lymphoma, and serious infections.  The patient understands that monitoring is required including a PPD at baseline and must alert us or the primary physician if symptoms of infection or other concerning signs are noted.
Quinolones Counseling:  I discussed with the patient the risks of fluoroquinolones including but not limited to GI upset, allergic reaction, drug rash, diarrhea, dizziness, photosensitivity, yeast infections, liver function test abnormalities, tendonitis/tendon rupture.
Topical Steroids Counseling: I discussed with the patient that prolonged use of topical steroids can result in the increased appearance of superficial blood vessels (telangiectasias), lightening (hypopigmentation) and thinning of the skin (atrophy).  Patient understands to avoid using high potency steroids in skin folds, the groin or the face.  The patient verbalized understanding of the proper use and possible adverse effects of topical steroids.  All of the patient's questions and concerns were addressed.
Enbrel Counseling:  I discussed with the patient the risks of etanercept including but not limited to myelosuppression, immunosuppression, autoimmune hepatitis, demyelinating diseases, lymphoma, and infections.  The patient understands that monitoring is required including a PPD at baseline and must alert us or the primary physician if symptoms of infection or other concerning signs are noted.
Rinvoq Counseling: I discussed with the patient the risks of Rinvoq therapy including but not limited to upper respiratory tract infections, shingles, cold sores, bronchitis, nausea, cough, fever, acne, and headache. Live vaccines should be avoided.  This medication has been linked to serious infections; higher rate of mortality; malignancy and lymphoproliferative disorders; major adverse cardiovascular events; thrombosis; thrombocytopenia, anemia, and neutropenia; lipid elevations; liver enzyme elevations; and gastrointestinal perforations.
Prednisone Counseling:  I discussed with the patient the risks of prolonged use of prednisone including but not limited to weight gain, insomnia, osteoporosis, mood changes, diabetes, susceptibility to infection, glaucoma and high blood pressure.  In cases where prednisone use is prolonged, patients should be monitored with blood pressure checks, serum glucose levels and an eye exam.  Additionally, the patient may need to be placed on GI prophylaxis, PCP prophylaxis, and calcium and vitamin D supplementation and/or a bisphosphonate.  The patient verbalized understanding of the proper use and the possible adverse effects of prednisone.  All of the patient's questions and concerns were addressed.
Sski Pregnancy And Lactation Text: This medication is Pregnancy Category D and isn't considered safe during pregnancy. It is excreted in breast milk.
Spironolactone Pregnancy And Lactation Text: This medication can cause feminization of the male fetus and should be avoided during pregnancy. The active metabolite is also found in breast milk.
Cimetidine Counseling:  I discussed with the patient the risks of Cimetidine including but not limited to gynecomastia, headache, diarrhea, nausea, drowsiness, arrhythmias, pancreatitis, skin rashes, psychosis, bone marrow suppression and kidney toxicity.
Rinvoq Pregnancy And Lactation Text: Based on animal studies, Rinvoq may cause embryo-fetal harm when administered to pregnant women.  The medication should not be used in pregnancy.  Breastfeeding is not recommended during treatment and for 6 days after the last dose.
Clofazimine Counseling:  I discussed with the patient the risks of clofazimine including but not limited to skin and eye pigmentation, liver damage, nausea/vomiting, gastrointestinal bleeding and allergy.
Topical Steroids Applications Pregnancy And Lactation Text: Most topical steroids are considered safe to use during pregnancy and lactation.  Any topical steroid applied to the breast or nipple should be washed off before breastfeeding.
Hydroxychloroquine Pregnancy And Lactation Text: This medication has been shown to cause fetal harm but it isn't assigned a Pregnancy Risk Category. There are small amounts excreted in breast milk.
Qbrexza Pregnancy And Lactation Text: There is no available data on Qbrexza use in pregnant women.  There is no available data on Qbrexza use in lactation.
5-Fu Counseling: 5-Fluorouracil Counseling:  I discussed with the patient the risks of 5-fluorouracil including but not limited to erythema, scaling, itching, weeping, crusting, and pain.
Griseofulvin Counseling:  I discussed with the patient the risks of griseofulvin including but not limited to photosensitivity, cytopenia, liver damage, nausea/vomiting and severe allergy.  The patient understands that this medication is best absorbed when taken with a fatty meal (e.g., ice cream or french fries).
Topical Sulfur Applications Counseling: Topical Sulfur Counseling: Patient counseled that this medication may cause skin irritation or allergic reactions.  In the event of skin irritation, the patient was advised to reduce the amount of the drug applied or use it less frequently.   The patient verbalized understanding of the proper use and possible adverse effects of topical sulfur application.  All of the patient's questions and concerns were addressed.
Low Dose Naltrexone Counseling- I discussed with the patient the potential risks and side effects of low dose naltrexone including but not limited to: more vivid dreams, headaches, nausea, vomiting, abdominal pain, fatigue, dizziness, and anxiety.
Thalidomide Counseling: I discussed with the patient the risks of thalidomide including but not limited to birth defects, anxiety, weakness, chest pain, dizziness, cough and severe allergy.
Xeljanz Counseling: I discussed with the patient the risks of Xeljanz therapy including increased risk of infection, liver issues, headache, diarrhea, or cold symptoms. Live vaccines should be avoided. They were instructed to call if they have any problems.
Rifampin Counseling: I discussed with the patient the risks of rifampin including but not limited to liver damage, kidney damage, red-orange body fluids, nausea/vomiting and severe allergy.
Humira Counseling:  I discussed with the patient the risks of adalimumab including but not limited to myelosuppression, immunosuppression, autoimmune hepatitis, demyelinating diseases, lymphoma, and serious infections.  The patient understands that monitoring is required including a PPD at baseline and must alert us or the primary physician if symptoms of infection or other concerning signs are noted.
Azithromycin Counseling:  I discussed with the patient the risks of azithromycin including but not limited to GI upset, allergic reaction, drug rash, diarrhea, and yeast infections.
Skyrizi Counseling: I discussed with the patient the risks of risankizumab-rzaa including but not limited to immunosuppression, and serious infections.  The patient understands that monitoring is required including a PPD at baseline and must alert us or the primary physician if symptoms of infection or other concerning signs are noted.
Cimetidine Pregnancy And Lactation Text: This medication is Pregnancy Category B and is considered safe during pregnancy. It is also excreted in breast milk and breast feeding isn't recommended.
Rhofade Counseling: Rhofade is a topical medication which can decrease superficial blood flow where applied. Side effects are uncommon and include stinging, redness and allergic reactions.
Hydroquinone Counseling:  Patient advised that medication may result in skin irritation, lightening (hypopigmentation), dryness, and burning.  In the event of skin irritation, the patient was advised to reduce the amount of the drug applied or use it less frequently.  Rarely, spots that are treated with hydroquinone can become darker (pseudoochronosis).  Should this occur, patient instructed to stop medication and call the office. The patient verbalized understanding of the proper use and possible adverse effects of hydroquinone.  All of the patient's questions and concerns were addressed.
Griseofulvin Pregnancy And Lactation Text: This medication is Pregnancy Category X and is known to cause serious birth defects. It is unknown if this medication is excreted in breast milk but breast feeding should be avoided.
Erythromycin Counseling:  I discussed with the patient the risks of erythromycin including but not limited to GI upset, allergic reaction, drug rash, diarrhea, increase in liver enzymes, and yeast infections.
Topical Clindamycin Counseling: Patient counseled that this medication may cause skin irritation or allergic reactions.  In the event of skin irritation, the patient was advised to reduce the amount of the drug applied or use it less frequently.   The patient verbalized understanding of the proper use and possible adverse effects of clindamycin.  All of the patient's questions and concerns were addressed.
Otezla Counseling: The side effects of Otezla were discussed with the patient, including but not limited to worsening or new depression, weight loss, diarrhea, nausea, upper respiratory tract infection, and headache. Patient instructed to call the office should any adverse effect occur.  The patient verbalized understanding of the proper use and possible adverse effects of Otezla.  All the patient's questions and concerns were addressed.
Isotretinoin Pregnancy And Lactation Text: This medication is Pregnancy Category X and is considered extremely dangerous during pregnancy. It is unknown if it is excreted in breast milk.
Cibinqo Counseling: I discussed with the patient the risks of Cibinqo therapy including but not limited to common cold, nausea, headache, cold sores, increased blood CPK levels, dizziness, UTIs, fatigue, acne, and vomitting. Live vaccines should be avoided.  This medication has been linked to serious infections; higher rate of mortality; malignancy and lymphoproliferative disorders; major adverse cardiovascular events; thrombosis; thrombocytopenia and lymphopenia; lipid elevations; and retinal detachment.
Cosentyx Counseling:  I discussed with the patient the risks of Cosentyx including but not limited to worsening of Crohn's disease, immunosuppression, allergic reactions and infections.  The patient understands that monitoring is required including a PPD at baseline and must alert us or the primary physician if symptoms of infection or other concerning signs are noted.
Benzoyl Peroxide Counseling: Patient counseled that medicine may cause skin irritation and bleach clothing.  In the event of skin irritation, the patient was advised to reduce the amount of the drug applied or use it less frequently.   The patient verbalized understanding of the proper use and possible adverse effects of benzoyl peroxide.  All of the patient's questions and concerns were addressed.
Xolair Counseling:  Patient informed of potential adverse effects including but not limited to fever, muscle aches, rash and allergic reactions.  The patient verbalized understanding of the proper use and possible adverse effects of Xolair.  All of the patient's questions and concerns were addressed.
Azathioprine Pregnancy And Lactation Text: This medication is Pregnancy Category D and isn't considered safe during pregnancy. It is unknown if this medication is excreted in breast milk.
Zyclara Counseling:  I discussed with the patient the risks of imiquimod including but not limited to erythema, scaling, itching, weeping, crusting, and pain.  Patient understands that the inflammatory response to imiquimod is variable from person to person and was educated regarded proper titration schedule.  If flu-like symptoms develop, patient knows to discontinue the medication and contact us.
Rituxan Counseling:  I discussed with the patient the risks of Rituxan infusions. Side effects can include infusion reactions, severe drug rashes including mucocutaneous reactions, reactivation of latent hepatitis and other infections and rarely progressive multifocal leukoencephalopathy.  All of the patient's questions and concerns were addressed.
Wegovy Counseling: I reviewed the possible side effects including: thyroid tumors, kidney disease, gallbladder disease, abdominal pain, constipation, diarrhea, nausea, vomiting and pancreatitis. Do not take this medication if you have a history or family history of multiple endocrine neoplasia syndrome type 2. Side effects reviewed, pt to contact office should one occur.
Opzelura Counseling:  I discussed with the patient the risks of Opzelura including but not limited to nasopharngitis, bronchitis, ear infection, eosinophila, hives, diarrhea, folliculitis, tonsillitis, and rhinorrhea.  Taken orally, this medication has been linked to serious infections; higher rate of mortality; malignancy and lymphoproliferative disorders; major adverse cardiovascular events; thrombosis; thrombocytopenia, anemia, and neutropenia; and lipid elevations.
High Dose Vitamin A Counseling: Side effects reviewed, pt to contact office should one occur.
Otezla Pregnancy And Lactation Text: This medication is Pregnancy Category C and it isn't known if it is safe during pregnancy. It is unknown if it is excreted in breast milk.
Cellcept Counseling:  I discussed with the patient the risks of mycophenolate mofetil including but not limited to infection/immunosuppression, GI upset, hypokalemia, hypercholesterolemia, bone marrow suppression, lymphoproliferative disorders, malignancy, GI ulceration/bleed/perforation, colitis, interstitial lung disease, kidney failure, progressive multifocal leukoencephalopathy, and birth defects.  The patient understands that monitoring is required including a baseline creatinine and regular CBC testing. In addition, patient must alert us immediately if symptoms of infection or other concerning signs are noted.
Benzoyl Peroxide Pregnancy And Lactation Text: This medication is Pregnancy Category C. It is unknown if benzoyl peroxide is excreted in breast milk.
Xolair Pregnancy And Lactation Text: This medication is Pregnancy Category B and is considered safe during pregnancy. This medication is excreted in breast milk.
Cibinqo Pregnancy And Lactation Text: It is unknown if this medication will adversely affect pregnancy or breast feeding.  You should not take this medication if you are currently pregnant or planning a pregnancy or while breastfeeding.
Erythromycin Pregnancy And Lactation Text: This medication is Pregnancy Category B and is considered safe during pregnancy. It is also excreted in breast milk.
Rituxan Pregnancy And Lactation Text: This medication is Pregnancy Category C and it isn't know if it is safe during pregnancy. It is unknown if this medication is excreted in breast milk but similar antibodies are known to be excreted.
Topical Clindamycin Pregnancy And Lactation Text: This medication is Pregnancy Category B and is considered safe during pregnancy. It is unknown if it is excreted in breast milk.
Opzelura Pregnancy And Lactation Text: There is insufficient data to evaluate drug-associated risk for major birth defects, miscarriage, or other adverse maternal or fetal outcomes.  There is a pregnancy registry that monitors pregnancy outcomes in pregnant persons exposed to the medication during pregnancy.  It is unknown if this medication is excreted in breast milk.  Do not breastfeed during treatment and for about 4 weeks after the last dose.
Oxybutynin Counseling:  I discussed with the patient the risks of oxybutynin including but not limited to skin rash, drowsiness, dry mouth, difficulty urinating, and blurred vision.
Sotyktu Counseling:  I discussed the most common side effects of Sotyktu including: common cold, sore throat, sinus infections, cold sores, canker sores, folliculitis, and acne.  I also discussed more serious side effects of Sotyktu including but not limited to: serious allergic reactions; increased risk for infections such as TB; cancers such as lymphomas; rhabdomyolysis and elevated CPK; and elevated triglycerides and liver enzymes. 
Wegovy Pregnancy And Lactation Text: The fetal risk of this medication is unknown and taking while pregnant is not recommended. It is unknown if this medication is present in breast milk.
High Dose Vitamin A Pregnancy And Lactation Text: High dose vitamin A therapy is contraindicated during pregnancy and breast feeding.
Siliq Counseling:  I discussed with the patient the risks of Siliq including but not limited to new or worsening depression, suicidal thoughts and behavior, immunosuppression, malignancy, posterior leukoencephalopathy syndrome, and serious infections.  The patient understands that monitoring is required including a PPD at baseline and must alert us or the primary physician if symptoms of infection or other concerning signs are noted. There is also a special program designed to monitor depression which is required with Siliq.
Picato Counseling:  I discussed with the patient the risks of Picato including but not limited to erythema, scaling, itching, weeping, crusting, and pain.
Metronidazole Counseling:  I discussed with the patient the risks of metronidazole including but not limited to seizures, nausea/vomiting, a metallic taste in the mouth, nausea/vomiting and severe allergy.
Topical Ketoconazole Counseling: Patient counseled that this medication may cause skin irritation or allergic reactions.  In the event of skin irritation, the patient was advised to reduce the amount of the drug applied or use it less frequently.   The patient verbalized understanding of the proper use and possible adverse effects of ketoconazole.  All of the patient's questions and concerns were addressed.
Dupixent Counseling: I discussed with the patient the risks of dupilumab including but not limited to eye infection and irritation, cold sores, injection site reactions, worsening of asthma, allergic reactions and increased risk of parasitic infection.  Live vaccines should be avoided while taking dupilumab. Dupilumab will also interact with certain medications such as warfarin and cyclosporine. The patient understands that monitoring is required and they must alert us or the primary physician if symptoms of infection or other concerning signs are noted.
Carac Counseling:  I discussed with the patient the risks of Carac including but not limited to erythema, scaling, itching, weeping, crusting, and pain.
Litfulo Counseling: I discussed with the patient the risks of Litfulo therapy including but not limited to upper respiratory tract infections, shingles, cold sores, and nausea. Live vaccines should be avoided.  This medication has been linked to serious infections; higher rate of mortality; malignancy and lymphoproliferative disorders; major adverse cardiovascular events; thrombosis; gastrointestinal perforations; neutropenia; lymphopenia; anemia; liver enzyme elevations; and lipid elevations.
Zepbound Counseling: I reviewed the possible side effects including: thyroid tumors, kidney disease, gallbladder disease, abdominal pain, constipation, diarrhea, nausea, vomiting and pancreatitis. Do not take this medication if you have a history or family history of multiple endocrine neoplasia syndrome type 2. Side effects reviewed, pt to contact office should one occur.
Hydroxyzine Counseling: Patient advised that the medication is sedating and not to drive a car after taking this medication.  Patient informed of potential adverse effects including but not limited to dry mouth, urinary retention, and blurry vision.  The patient verbalized understanding of the proper use and possible adverse effects of hydroxyzine.  All of the patient's questions and concerns were addressed.
Sotyktu Pregnancy And Lactation Text: There is insufficient data to evaluate whether or not Sotyktu is safe to use during pregnancy.   It is not known if Sotyktu passes into breast milk and whether or not it is safe to use when breastfeeding.  
Litfulo Pregnancy And Lactation Text: Based on animal studies, Lifulo may cause embryo-fetal harm when administered to pregnant women.  The medication should not be used in pregnancy.  Breastfeeding is not recommended during treatment.
Dupixent Pregnancy And Lactation Text: This medication likely crosses the placenta but the risk for the fetus is uncertain. This medication is excreted in breast milk.
Cyclophosphamide Counseling:  I discussed with the patient the risks of cyclophosphamide including but not limited to hair loss, hormonal abnormalities, decreased fertility, abdominal pain, diarrhea, nausea and vomiting, bone marrow suppression and infection. The patient understands that monitoring is required while taking this medication.
Metronidazole Pregnancy And Lactation Text: This medication is Pregnancy Category B and considered safe during pregnancy.  It is also excreted in breast milk.
Hydroxyzine Pregnancy And Lactation Text: This medication is not safe during pregnancy and should not be taken. It is also excreted in breast milk and breast feeding isn't recommended.
Topical Metronidazole Counseling: Metronidazole is a topical antibiotic medication. You may experience burning, stinging, redness, or allergic reactions.  Please call our office if you develop any problems from using this medication.
Propranolol Counseling:  I discussed with the patient the risks of propranolol including but not limited to low heart rate, low blood pressure, low blood sugar, restlessness and increased cold sensitivity. They should call the office if they experience any of these side effects.
Olumiant Counseling: I discussed with the patient the risks of Olumiant therapy including but not limited to upper respiratory tract infections, shingles, cold sores, and nausea. Live vaccines should be avoided.  This medication has been linked to serious infections; higher rate of mortality; malignancy and lymphoproliferative disorders; major adverse cardiovascular events; thrombosis; gastrointestinal perforations; neutropenia; lymphopenia; anemia; liver enzyme elevations; and lipid elevations.
Minocycline Counseling: Patient advised regarding possible photosensitivity and discoloration of the teeth, skin, lips, tongue and gums.  Patient instructed to avoid sunlight, if possible.  When exposed to sunlight, patients should wear protective clothing, sunglasses, and sunscreen.  The patient was instructed to call the office immediately if the following severe adverse effects occur:  hearing changes, easy bruising/bleeding, severe headache, or vision changes.  The patient verbalized understanding of the proper use and possible adverse effects of minocycline.  All of the patient's questions and concerns were addressed.
Ebglyss Counseling: I discussed with the patient the risks of lebrikizumab including but not limited to eye inflammation and irritation, cold sores, injection site reactions, allergic reactions and increased risk of parasitic infection. The patient understands that monitoring is required and they must alert us or the primary physician if symptoms of infection or other concerning signs are noted.
Calcipotriene Counseling:  I discussed with the patient the risks of calcipotriene including but not limited to erythema, scaling, itching, and irritation.
Detail Level: Simple
Simlandi Counseling:  I discussed with the patient the risks of adalimumab including but not limited to myelosuppression, immunosuppression, autoimmune hepatitis, demyelinating diseases, lymphoma, and serious infections.  The patient understands that monitoring is required including a PPD at baseline and must alert us or the primary physician if symptoms of infection or other concerning signs are noted.
Cyclophosphamide Pregnancy And Lactation Text: This medication is Pregnancy Category D and it isn't considered safe during pregnancy. This medication is excreted in breast milk.
Klisyri Pregnancy And Lactation Text: It is unknown if this medication can harm a developing fetus or if it is excreted in breast milk.
Terbinafine Counseling: Patient counseling regarding adverse effects of terbinafine including but not limited to headache, diarrhea, rash, upset stomach, liver function test abnormalities, itching, taste/smell disturbance, nausea, abdominal pain, and flatulence.  There is a rare possibility of liver failure that can occur when taking terbinafine.  The patient understands that a baseline LFT and kidney function test may be required. The patient verbalized understanding of the proper use and possible adverse effects of terbinafine.  All of the patient's questions and concerns were addressed.
Libtayo Counseling- I discussed with the patient the risks of Libtayo including but not limited to nausea, vomiting, diarrhea, and bone or muscle pain.  The patient verbalized understanding of the proper use and possible adverse effects of Libtayo.  All of the patient's questions and concerns were addressed.
Clindamycin Counseling: I counseled the patient regarding use of clindamycin as an antibiotic for prophylactic and/or therapeutic purposes. Clindamycin is active against numerous classes of bacteria, including skin bacteria. Side effects may include nausea, diarrhea, gastrointestinal upset, rash, hives, yeast infections, and in rare cases, colitis.
Aklief counseling:  Patient advised to apply a pea-sized amount only at bedtime and wait 30 minutes after washing their face before applying.  If too drying, patient may add a non-comedogenic moisturizer.  The most commonly reported side effects including irritation, redness, scaling, dryness, stinging, burning, itching, and increased risk of sunburn.  The patient verbalized understanding of the proper use and possible adverse effects of retinoids.  All of the patient's questions and concerns were addressed.
Topical Retinoid counseling:  Patient advised to apply a pea-sized amount only at bedtime and wait 30 minutes after washing their face before applying.  If too drying, patient may add a non-comedogenic moisturizer. The patient verbalized understanding of the proper use and possible adverse effects of retinoids.  All of the patient's questions and concerns were addressed.
Albendazole Counseling:  I discussed with the patient the risks of albendazole including but not limited to cytopenia, kidney damage, nausea/vomiting and severe allergy.  The patient understands that this medication is being used in an off-label manner.
Taltz Counseling: I discussed with the patient the risks of ixekizumab including but not limited to immunosuppression, serious infections, worsening of inflammatory bowel disease and drug reactions.  The patient understands that monitoring is required including a PPD at baseline and must alert us or the primary physician if symptoms of infection or other concerning signs are noted.
Olanzapine Counseling- I discussed with the patient the common side effects of olanzapine including but are not limited to: lack of energy, dry mouth, increased appetite, sleepiness, tremor, constipation, dizziness, changes in behavior, or restlessness.  Explained that teenagers are more likely to experience headaches, abdominal pain, pain in the arms or legs, tiredness, and sleepiness.  Serious side effects include but are not limited: increased risk of death in elderly patients who are confused, have memory loss, or dementia-related psychosis; hyperglycemia; increased cholesterol and triglycerides; and weight gain.
VTAMA Counseling: I discussed with the patient that VTAMA is not for use in the eyes, mouth or mouth. They should call the office if they develop any signs of allergic reactions to VTAMA. The patient verbalized understanding of the proper use and possible adverse effects of VTAMA.  All of the patient's questions and concerns were addressed.
Acitretin Pregnancy And Lactation Text: This medication is Pregnancy Category X and should not be given to women who are pregnant or may become pregnant in the future. This medication is excreted in breast milk.
Finasteride Pregnancy And Lactation Text: This medication is absolutely contraindicated during pregnancy. It is unknown if it is excreted in breast milk.
Bimzelx Counseling:  I discussed with the patient the risks of Bimzelx including but not limited to depression, immunosuppression, allergic reactions and infections.  The patient understands that monitoring is required including a PPD at baseline and must alert us or the primary physician if symptoms of infection or other concerning signs are noted.
Infliximab Counseling:  I discussed with the patient the risks of infliximab including but not limited to myelosuppression, immunosuppression, autoimmune hepatitis, demyelinating diseases, lymphoma, and serious infections.  The patient understands that monitoring is required including a PPD at baseline and must alert us or the primary physician if symptoms of infection or other concerning signs are noted.
Saxenda Counseling: I reviewed the possible side effects including: thyroid tumors, kidney disease, gallbladder disease, abdominal pain, constipation, diarrhea, nausea, vomiting and pancreatitis. Do not take this medication if you have a history or family history of multiple endocrine neoplasia syndrome type 2. Side effects reviewed, pt to contact office should one occur.
Libtayo Pregnancy And Lactation Text: This medication is contraindicated in pregnancy and when breast feeding.
Minoxidil Counseling: Minoxidil is a topical medication which can increase blood flow where it is applied. It is uncertain how this medication increases hair growth. Side effects are uncommon and include stinging and allergic reactions.
Bexarotene Counseling:  I discussed with the patient the risks of bexarotene including but not limited to hair loss, dry lips/skin/eyes, liver abnormalities, hyperlipidemia, pancreatitis, depression/suicidal ideation, photosensitivity, drug rash/allergic reactions, hypothyroidism, anemia, leukopenia, infection, cataracts, and teratogenicity.  Patient understands that they will need regular blood tests to check lipid profile, liver function tests, white blood cell count, thyroid function tests and pregnancy test if applicable.
Olanzapine Pregnancy And Lactation Text: This medication is pregnancy category C.   There are no adequate and well controlled trials with olanzapine in pregnant females.  Olanzapine should be used during pregnancy only if the potential benefit justifies the potential risk to the fetus.   In a study in lactating healthy women, olanzapine was excreted in breast milk.  It is recommended that women taking olanzapine should not breast feed.
Clindamycin Pregnancy And Lactation Text: This medication can be used in pregnancy if certain situations. Clindamycin is also present in breast milk.
Bimzelx Pregnancy And Lactation Text: This medication crosses the placenta and the safety is uncertain during pregnancy. It is unknown if this medication is present in breast milk.
Glycopyrrolate Counseling:  I discussed with the patient the risks of glycopyrrolate including but not limited to skin rash, drowsiness, dry mouth, difficulty urinating, and blurred vision.
Vtama Pregnancy And Lactation Text: It is unknown if this medication can cause problems during pregnancy and breastfeeding.
Aklief Pregnancy And Lactation Text: It is unknown if this medication is safe to use during pregnancy.  It is unknown if this medication is excreted in breast milk.  Breastfeeding women should use the topical cream on the smallest area of the skin for the shortest time needed while breastfeeding.  Do not apply to nipple and areola.
Birth Control Pills Counseling: Birth Control Pill Counseling: I discussed with the patient the potential side effects of OCPs including but not limited to increased risk of stroke, heart attack, thrombophlebitis, deep venous thrombosis, hepatic adenomas, breast changes, GI upset, headaches, and depression.  The patient verbalized understanding of the proper use and possible adverse effects of OCPs. All of the patient's questions and concerns were addressed.
Cantharidin Pregnancy And Lactation Text: This medication has not been proven safe during pregnancy. It is unknown if this medication is excreted in breast milk.
Odomzo Counseling- I discussed with the patient the risks of Odomzo including but not limited to nausea, vomiting, diarrhea, constipation, weight loss, changes in the sense of taste, decreased appetite, muscle spasms, and hair loss.  The patient verbalized understanding of the proper use and possible adverse effects of Odomzo.  All of the patient's questions and concerns were addressed.
Oral Minoxidil Counseling- I discussed with the patient the risks of oral minoxidil including but not limited to shortness of breath, swelling of the feet or ankles, dizziness, lightheadedness, unwanted hair growth and allergic reaction.  The patient verbalized understanding of the proper use and possible adverse effects of oral minoxidil.  All of the patient's questions and concerns were addressed.
Zoryve Counseling:  I discussed with the patient that Zoryve is not for use in the eyes, mouth or vagina. The most commonly reported side effects include diarrhea, headache, insomnia, application site pain, upper respiratory tract infections, and urinary tract infections.  All of the patient's questions and concerns were addressed.
Nemluvio Counseling: I discussed with the patient the risks of nemolizumab including but not limited to headache, gastrointestinal complaints, nasopharyngitis, musculoskeletal complaints, injection site reactions, and allergic reactions. The patient understands that monitoring is required and they must alert us or the primary physician if any side effects are noted.
Birth Control Pills Pregnancy And Lactation Text: This medication should be avoided if pregnant and for the first 30 days post-partum.
Bexarotene Pregnancy And Lactation Text: This medication is Pregnancy Category X and should not be given to women who are pregnant or may become pregnant. This medication should not be used if you are breast feeding.
Tazorac Counseling:  Patient advised that medication is irritating and drying.  Patient may need to apply sparingly and wash off after an hour before eventually leaving it on overnight.  The patient verbalized understanding of the proper use and possible adverse effects of tazorac.  All of the patient's questions and concerns were addressed.
Doxycycline Counseling:  Patient counseled regarding possible photosensitivity and increased risk for sunburn.  Patient instructed to avoid sunlight, if possible.  When exposed to sunlight, patients should wear protective clothing, sunglasses, and sunscreen.  The patient was instructed to call the office immediately if the following severe adverse effects occur:  hearing changes, easy bruising/bleeding, severe headache, or vision changes.  The patient verbalized understanding of the proper use and possible adverse effects of doxycycline.  All of the patient's questions and concerns were addressed.
Azelaic Acid Counseling: Patient counseled that medicine may cause skin irritation and to avoid applying near the eyes.  In the event of skin irritation, the patient was advised to reduce the amount of the drug applied or use it less frequently.   The patient verbalized understanding of the proper use and possible adverse effects of azelaic acid.  All of the patient's questions and concerns were addressed.
Tremfya Counseling: I discussed with the patient the risks of guselkumab including but not limited to immunosuppression, serious infections, and drug reactions.  The patient understands that monitoring is required including a PPD at baseline and must alert us or the primary physician if symptoms of infection or other concerning signs are noted.
Glycopyrrolate Pregnancy And Lactation Text: This medication is Pregnancy Category B and is considered safe during pregnancy. It is unknown if it is excreted breast milk.
Cimzia Counseling:  I discussed with the patient the risks of Cimzia including but not limited to immunosuppression, allergic reactions and infections.  The patient understands that monitoring is required including a PPD at baseline and must alert us or the primary physician if symptoms of infection or other concerning signs are noted.
Semaglutide Counseling: I reviewed the possible side effects including: thyroid tumors, kidney disease, gallbladder disease, abdominal pain, constipation, diarrhea, nausea, vomiting and pancreatitis. Do not take this medication if you have a history or family history of multiple endocrine neoplasia syndrome type 2. Side effects reviewed, pt to contact office should one occur.
Mirvaso Counseling: Mirvaso is a topical medication which can decrease superficial blood flow where applied. Side effects are uncommon and include stinging, redness and allergic reactions.
Spironolactone Counseling: Patient advised regarding risks of diarrhea, abdominal pain, hyperkalemia, birth defects (for female patients), liver toxicity and renal toxicity. The patient may need blood work to monitor liver and kidney function and potassium levels while on therapy. The patient verbalized understanding of the proper use and possible adverse effects of spironolactone.  All of the patient's questions and concerns were addressed.
Tazorac Pregnancy And Lactation Text: This medication is not safe during pregnancy. It is unknown if this medication is excreted in breast milk.
Oral Minoxidil Pregnancy And Lactation Text: This medication should only be used when clearly needed if you are pregnant, attempting to become pregnant or breast feeding.
Azathioprine Counseling:  I discussed with the patient the risks of azathioprine including but not limited to myelosuppression, immunosuppression, hepatotoxicity, lymphoma, and infections.  The patient understands that monitoring is required including baseline LFTs, Creatinine, possible TPMP genotyping and weekly CBCs for the first month and then every 2 weeks thereafter.  The patient verbalized understanding of the proper use and possible adverse effects of azathioprine.  All of the patient's questions and concerns were addressed.
Cimzia Pregnancy And Lactation Text: This medication crosses the placenta but can be considered safe in certain situations. Cimzia may be excreted in breast milk.
Isotretinoin Counseling: Patient should get monthly blood tests, not donate blood, not drive at night if vision affected, not share medication, and not undergo elective surgery for 6 months after tx completed. Side effects reviewed, pt to contact office should one occur.
Doxycycline Pregnancy And Lactation Text: This medication is Pregnancy Category D and not consider safe during pregnancy. It is also excreted in breast milk but is considered safe for shorter treatment courses.
Nemluvio Pregnancy And Lactation Text: It is not known if Nemluvio causes fetal harm or is present in breast milk. Please proceed with caution if patients who are pregnant or breastfeeding.

## 2025-02-19 NOTE — PROCEDURE: PATIENT SPECIFIC COUNSELING
- still w/ itching and excoriations dejon on scalp and post neck; much of trunk improved\\n-ok to perhaps trial nemluvio - r/b/sed\\n-will sample dupixent for dose this week and will switch to nemluvio at next injection\\n- Recommended P.T. for back and neck pain. Pt to get referral from pcp. PT likely will help with itching in scalp.\\n- will refill clobetasol cr, lido/amitryp/ketamine topical\\n- pt tried and failed: dupixent, tac 0.1%, fluocinonide sln, clobetasol cr, prednisone\\n- continue dry and sensitive skin care\\n-(prev bx = excoriation and labs wnl except sl low B12)\\nrtc 2-3mo
Detail Level: Detailed

## 2025-02-26 RX ORDER — NEMOLIZUMAB-ILTO 30 MG/100MG
INJECTION, POWDER, LYOPHILIZED, FOR SOLUTION SUBCUTANEOUS
Qty: 1 | Refills: 2 | Status: ERX

## 2025-02-26 RX ORDER — NEMOLIZUMAB-ILTO 30 MG/100MG
INJECTION, POWDER, LYOPHILIZED, FOR SOLUTION SUBCUTANEOUS
Qty: 2 | Refills: 0 | Status: ERX

## 2025-03-11 RX ORDER — NEMOLIZUMAB-ILTO 30 MG/100MG
INJECTION, POWDER, LYOPHILIZED, FOR SOLUTION SUBCUTANEOUS
Qty: 1 | Refills: 2 | Status: ERX

## 2025-03-14 ENCOUNTER — RX ONLY (RX ONLY)
Age: 80
End: 2025-03-14

## 2025-03-14 RX ORDER — NEMOLIZUMAB-ILTO 30 MG/100MG
INJECTION, POWDER, LYOPHILIZED, FOR SOLUTION SUBCUTANEOUS
Qty: 2 | Refills: 0 | Status: ERX

## 2025-04-08 ENCOUNTER — APPOINTMENT (OUTPATIENT)
Dept: URBAN - METROPOLITAN AREA CLINIC 73 | Facility: CLINIC | Age: 80
Setting detail: DERMATOLOGY
End: 2025-04-08

## 2025-04-08 DIAGNOSIS — L20.89 OTHER ATOPIC DERMATITIS: ICD-10-CM

## 2025-04-08 PROCEDURE — 96372 THER/PROPH/DIAG INJ SC/IM: CPT

## 2025-04-08 PROCEDURE — ? BIOLOGIC INJECTION TRAINING

## 2025-04-08 PROCEDURE — ? NEMLUVIO INJECTION

## 2025-04-08 ASSESSMENT — LOCATION SIMPLE DESCRIPTION DERM: LOCATION SIMPLE: LEFT THIGH

## 2025-04-08 ASSESSMENT — LOCATION DETAILED DESCRIPTION DERM
LOCATION DETAILED: LEFT ANTERIOR DISTAL THIGH
LOCATION DETAILED: LEFT ANTERIOR PROXIMAL THIGH

## 2025-04-08 ASSESSMENT — LOCATION ZONE DERM: LOCATION ZONE: LEG

## 2025-04-08 NOTE — PROCEDURE: NEMLUVIO INJECTION
Date Of Next Injection: 4 Weeks
Detail Level: None
J-Code: 
Expiration Date (Optional): 08/2026
Was The Medication Purchased By The Clinic?: No
Use Enhanced Ndc?: Yes
Nemolizumab Amount: 30 mg
Syringe Size Used (Required For Enhanced Ndc): 30mg Prefilled Pen
Consent: The risks of pain and injection site reactions were reviewed with the patient prior to the injection.
Lot # (Optional): [id number]

## 2025-04-08 NOTE — PROCEDURE: MIPS QUALITY
Name And Contact Information For Health Care Proxy: unknown
Quality 47: Advance Care Plan: Advance Care Planning discussed and documented; advance care plan or surrogate decision maker documented in the medical record.
Quality 130: Documentation Of Current Medications In The Medical Record: Current Medications Documented
Detail Level: Detailed

## 2025-04-08 NOTE — PROCEDURE: BIOLOGIC INJECTION TRAINING
Tremfya Training Text: We discussed Tremfya injection. I demonstrated how to clean the skin and administer the medication.
Nemluvio Training Text: We discussed Nemluvio injection. I demonstrated how to clean the skin and administer the medication.\\n\\nPt reports being at week 4. Pt brought her own nemluvio pen.
Dupixent Training Text: We discussed Dupixent injection. I demonstrated how to clean the skin and administer the medication.
Taltz Training Text: We discussed Taltz injection. I demonstrated how to clean the skin and administer the medication.
Adbry Training Text: We discussed Adbry injection. I demonstrated how to clean the skin and administer the medication.
Xolair Training Text: We discussed Xolair injection. I demonstrated how to clean the skin and administer the medication.
Bimzelx Training Text: We discussed Bimzelx injection. I demonstrated how to clean the skin and administer the medication.
Siliq Training Text: We discussed Siliq injection. I demonstrated how to clean the skin and administer the medication.
Stelara Training Text: We discussed Stelara injection. I demonstrated how to clean the skin and administer the medication.
Cosentyx Training Text: We discussed Cosentyx injection. I demonstrated how to clean the skin and administer the medication.
Yusimry Training Text: We discussed Yusimry injection. I demonstrated how to clean the skin and administer the medication.
Skyrizi Training Text: We discussed Skyrizi injection. I demonstrated how to clean the skin and administer the medication.
Which Biologic Is The Patient Receiving Training For?: Nemluvio
Simponi Training Text: We discussed Simponi injection. I demonstrated how to clean the skin and administer the medication.
Humira Training Text: We discussed Humira injection. I demonstrated how to clean the skin and administer the medication.
Cimzia Training Text: We discussed Cimzia injection. I demonstrated how to clean the skin and administer the medication.
Ebglyss Training Text: We discussed Ebglyss injection. I demonstrated how to clean the skin and administer the medication.
Detail Level: Simple
Who Did You Train: The Patient
Enbrel Training Text: We discussed Enbrel injection. I demonstrated how to clean the skin and administer the medication.
Ilumya Training Text: We discussed Ilumya injection. I demonstrated how to clean the skin and administer the medication.
Hadlima Training Text: We discussed Hadlima injection. I demonstrated how to clean the skin and administer the medication.

## 2025-06-11 ENCOUNTER — APPOINTMENT (OUTPATIENT)
Dept: URBAN - METROPOLITAN AREA CLINIC 73 | Facility: CLINIC | Age: 80
Setting detail: DERMATOLOGY
End: 2025-06-11

## 2025-06-11 DIAGNOSIS — L84 CORNS AND CALLOSITIES: ICD-10-CM

## 2025-06-11 DIAGNOSIS — L20.89 OTHER ATOPIC DERMATITIS: ICD-10-CM | Status: IMPROVED

## 2025-06-11 PROCEDURE — ? PRESCRIPTION SAMPLES PROVIDED

## 2025-06-11 PROCEDURE — ? TREATMENT REGIMEN

## 2025-06-11 PROCEDURE — ? COUNSELING

## 2025-06-11 PROCEDURE — ? PRESCRIPTION

## 2025-06-11 PROCEDURE — ? PATIENT SPECIFIC COUNSELING

## 2025-06-11 PROCEDURE — ? PARING HYPERKERATOTIC LESION

## 2025-06-11 RX ORDER — NEMOLIZUMAB-ILTO 30 MG/100MG
INJECTION, POWDER, LYOPHILIZED, FOR SOLUTION SUBCUTANEOUS
Qty: 1 | Refills: 1 | Status: ERX

## 2025-06-11 ASSESSMENT — LOCATION SIMPLE DESCRIPTION DERM
LOCATION SIMPLE: RIGHT PLANTAR SURFACE
LOCATION SIMPLE: POSTERIOR NECK
LOCATION SIMPLE: ABDOMEN
LOCATION SIMPLE: POSTERIOR SCALP
LOCATION SIMPLE: UPPER BACK

## 2025-06-11 ASSESSMENT — LOCATION ZONE DERM
LOCATION ZONE: NECK
LOCATION ZONE: SCALP
LOCATION ZONE: FEET
LOCATION ZONE: TRUNK

## 2025-06-11 ASSESSMENT — LOCATION DETAILED DESCRIPTION DERM
LOCATION DETAILED: RIGHT PLANTAR FOREFOOT OVERLYING 4TH METATARSAL
LOCATION DETAILED: LEFT RIB CAGE
LOCATION DETAILED: LEFT INFERIOR POSTERIOR NECK
LOCATION DETAILED: LEFT SUPERIOR OCCIPITAL SCALP
LOCATION DETAILED: INFERIOR THORACIC SPINE

## 2025-06-11 ASSESSMENT — SEVERITY ASSESSMENT 2020: SEVERITY 2020: MILD

## 2025-06-11 ASSESSMENT — BSA ECZEMA: % BODY COVERED IN ECZEMA: 2

## 2025-06-11 NOTE — PROCEDURE: TREATMENT REGIMEN
Otc Regimen: Ciales emollients/ gentle moisturizer
Detail Level: Zone
Continue Regimen: Clobetasol cream 0.05% apply to affected areas on body bid x 7-10 days\\n\\nLidocaine 2%/Amitriptylline 2%/ ketamine 1% Apply to affected areas on body bid\\n\\nNemluvio inject 30mg (1 pen) subcutaneously every 4 weeks
Otc Regimen: Dr Pickard’s corn pads

## 2025-06-11 NOTE — PROCEDURE: PARING HYPERKERATOTIC LESION
Paring Method: curette
Medical Necessity Information: LCD Guidelines vary from payer to payer. Please check with your payer's policy to determine medical necessity. Many payers require at least 1 Class A indication, 2 Class B indications or 1 Class B and 2 Class C to qualify for insurance payment.
Medical Necessity Clause: This procedure was medically necessary because the patient has extreme pain

## 2025-06-11 NOTE — PROCEDURE: PATIENT SPECIFIC COUNSELING
- Pt reports it is early to tell if improved but does feel better after 2 doses nemluvio (third dose misfired last week)\\n- pt thinks atopic derm is more controlled with nemluvio than dupixent.\\n- pt reports scalp is healing and improved. 4 out of 10 in terms of itch/discomfort whereas was much worse prior\\n- pt will be given nemluvio sample today because of misfire third dose last week\\n- pt will call our office after August dose. Pt will let our office know if she wants to stay on [id number] or [id number].\\nrtc 3-6mo
Detail Level: Detailed
Pressure off load. \\nPared down corn today. Pt to get otc corn pads.\\nSee podiatrist for recurrence.

## 2025-06-16 RX ORDER — NEMOLIZUMAB-ILTO 30 MG/100MG
INJECTION, POWDER, LYOPHILIZED, FOR SOLUTION SUBCUTANEOUS
Qty: 1 | Refills: 1 | Status: ERX

## 2025-07-02 ENCOUNTER — RX ONLY (RX ONLY)
Age: 80
End: 2025-07-02

## 2025-07-02 RX ORDER — NEMOLIZUMAB-ILTO 30 MG/100MG
INJECTION, POWDER, LYOPHILIZED, FOR SOLUTION SUBCUTANEOUS
Qty: 1 | Refills: 0 | Status: ERX

## 2025-09-02 ENCOUNTER — RX ONLY (RX ONLY)
Age: 80
End: 2025-09-02

## 2025-09-02 RX ORDER — NEMOLIZUMAB-ILTO 30 MG/100MG
INJECTION, POWDER, LYOPHILIZED, FOR SOLUTION SUBCUTANEOUS
Qty: 1 | Refills: 0 | Status: ERX